# Patient Record
Sex: FEMALE | Race: WHITE | Employment: UNEMPLOYED | URBAN - METROPOLITAN AREA
[De-identification: names, ages, dates, MRNs, and addresses within clinical notes are randomized per-mention and may not be internally consistent; named-entity substitution may affect disease eponyms.]

---

## 2022-11-03 PROBLEM — I10 ESSENTIAL HYPERTENSION: Status: ACTIVE | Noted: 2022-11-03

## 2022-11-03 PROBLEM — E11.65 TYPE 2 DIABETES MELLITUS WITH HYPERGLYCEMIA, WITHOUT LONG-TERM CURRENT USE OF INSULIN (HCC): Status: ACTIVE | Noted: 2022-11-03

## 2022-11-03 PROBLEM — Z97.5 IUD (INTRAUTERINE DEVICE) IN PLACE: Status: ACTIVE | Noted: 2022-11-03

## 2022-11-03 PROBLEM — N92.6 IRREGULAR PERIODS/MENSTRUAL CYCLES: Status: ACTIVE | Noted: 2022-11-03

## 2022-12-13 NOTE — TELEPHONE ENCOUNTER
----- Message from Jareth Carmen MD sent at 12/13/2022 10:55 AM CST -----  Please inform by MyChart, mail, or call of normal laboratory tests--FSH shows that she is not yet menopausal.  Keep next appt

## 2022-12-19 PROBLEM — D21.9 FIBROID: Status: ACTIVE | Noted: 2022-12-19

## 2022-12-19 PROBLEM — T83.32XA IUD THREADS LOST: Status: ACTIVE | Noted: 2022-12-19

## 2023-01-18 NOTE — TELEPHONE ENCOUNTER
Routed to Dr Lea Limon for advise. Rx listed as historical, pls advise, thanks in advance. LOV 11-3-22. Refill passed per [facility] [facility] protocol   Requested Prescriptions   Pending Prescriptions Disp Refills    metFORMIN HCl 1000 MG Oral Tab  0     Sig: Take 1 tablet (1,000 mg total) by mouth 2 (two) times daily with meals. Diabetes Medication Protocol Passed - 1/18/2023 10:45 AM        Passed - Last A1C < 7.5 and within past 6 months     Lab Results   Component Value Date    A1C 6.9 (H) 12/12/2022             Passed - In person appointment or virtual visit in the past 6 mos or appointment in next 3 mos     Recent Outpatient Visits              1 month ago Intrauterine contraceptive device threads lost, subsequent encounter    [address] [facility], [place] [address] Cece Ohara MD    Office Visit    1 month ago Hot flashes    [address] [facility], 9509 Jocelynn Howell MD    Office Visit    2 months ago Encounter for annual health examination    [address] [facility], Leona Griggs MD    Office Visit          Future Appointments         Provider Department Appt Notes    In 1 week Cece Ohara MD [address], [address], [address] [facility]insertion    In 1 month Inis MD Elizabeth [address], [address] [facility] Lump on (R) of neck    In 1 month Bharathi Tello MD [address] [facility] np dm ee, glasses, policy informed    In 4 months Radha Zacarias MD [address], [address], [place] colon cancer screening               Passed - EGFRCR or GFRNAA > 50     GFR Evaluation  EGFRCR: 113 , resulted on 12/12/2022          Passed - GFR in the past 12 months          hydroCHLOROthiazide 12.5 MG Oral Cap  0     Sig: Take 1 capsule (12.5 mg total) by mouth daily.        Hypertensive Medications Protocol Failed - 1/18/2023 10:45 AM        Failed - Last BP reading less than 140/90     BP Readings from Last 1 Encounters:  12/19/22 : 143/84              Passed - In person appointment in the past 12 or next 3 months     Recent Outpatient Visits              1 month ago Intrauterine contraceptive device threads lost, subsequent encounter    Neil Sheriff. [address] Lynn Juarez MD    Office Visit    1 month ago Hot flashes    Khushi Sheriff, Ethel Beckman MD    Office Visit    2 months ago Encounter for annual health examination    Khushi Sheriff, Enrique Francisco MD    Office Visit          Future Appointments         Provider Department Appt Notes    In 1 week MD Kyleigh Aburto, Khushi Murphy, [address] removal/insertion    In 1 month Marybelle Najjar, MD Jeannetta Mullet, [address] [facility], [place] Lump on (R) of neck    In 1 month Luis Coronado MD [facility], [address], [place] np dm ee, glasses, policy informed    In 4 months Delilah Becerril MD [facility], [address], [place] colon cancer screening               Passed - CMP or BMP in past 6 months     Recent Results (from the past 4392 hour(s))   COMP METABOLIC PANEL (14)    Collection Time: 12/12/22 11:54 AM   Result Value Ref Range    Glucose 136 (H) 70 - 99 mg/dL    Sodium 138 136 - 145 mmol/L    Potassium 3.8 3.5 - 5.1 mmol/L    Chloride 104 98 - 112 mmol/L    CO2 28.0 21.0 - 32.0 mmol/L    Anion Gap 6 0 - 18 mmol/L    BUN 11 7 - 18 mg/dL    Creatinine 0.53 (L) 0.55 - 1.02 mg/dL    BUN/CREA Ratio 20.8 (H) 10.0 - 20.0    Calcium, Total 9.2 8.5 - 10.1 mg/dL    Calculated Osmolality 287 275 - 295 mOsm/kg    eGFR-Cr 113 >=60 mL/min/1.73m2    ALT 22 13 - 56 U/L    AST 10 (L) 15 - 37 U/L    Alkaline Phosphatase 69 39 - 100 U/L    Bilirubin, Total 0.6 0.1 - 2.0 mg/dL    Total Protein 7.1 6.4 - 8.2 g/dL    Albumin 3.8 3.4 - 5.0 g/dL    Globulin  3.3 2.8 - 4.4 g/dL    A/G Ratio 1.2 1.0 - 2.0    Patient Fasting for CMP? No      *Note: Due to a large number of results and/or encounters for the requested time period, some results have not been displayed. A complete set of results can be found in Results Review.                Passed - In person appointment or virtual visit in the past 6 months     Recent Outpatient Visits              1 month ago Intrauterine contraceptive device threads lost, subsequent encounter    JER Bryan MD    Office Visit    1 month ago Hot flashes    [address] [facility], 9509 Jocelynn Howell MD    Office Visit    2 months ago Encounter for annual health examination    [address] [facility], Tabitha Acuna MD    Office Visit          Future Appointments         Provider Department Appt Notes    In 1 week Hoang Eli MD [address], [address], [address] [facility] [address] removal/insertion    In 1 month Isidro Eddy MD [address], [address] [facility], [place] Lump on (R) of neck    In 1 month Asher Cavanaugh MD [address] [facility], [place] np dm ee, glasses, policy informed    In 4 months Annabella Loyd MD [address], [address], [place] colon cancer screening               Passed - [facility] or GFRNAA > 50     GFR Evaluation  EGFRCR: 113 , resulted on 12/12/2022

## 2023-01-30 NOTE — TELEPHONE ENCOUNTER
Please schedule the following surgery:    Procedure: Endosee removal of IUD; placement of Mirena IUD  Date: Thursday, Feb 2 morning  Dx: Lost iud strings; menorrhagia; fibroids; replacement of new IUD  Pre-op appt: (Y/N or n/a) done  Admission type: (IN/OUT/OBVS) office  Department of discharge(SDS/Floor): Office  Procedure length time (please enter amount you are requesting): 30 minutes  Recovery time (patients always ask): 24 hours  Medical Clearance: (Y/N) no      ALL Medicaid/including BCBS community: Tubal/ Hyst form MUST be signed (30 days):     COVID19 Lab 5062 needs to be placed for all C-sections, IOL & Versions     Message to nurses:

## 2023-01-30 NOTE — TELEPHONE ENCOUNTER
Called and spoke to pt. Pt agrees to appt on Thursday, 2/2 @ 10AM. Informed that she will need to leave a urine sample at time of visit.      Routing to [employer] as Mabel Menard

## 2023-02-02 PROBLEM — Z30.433 ENCOUNTER FOR IUD REMOVAL AND REINSERTION: Status: ACTIVE | Noted: 2023-02-02

## 2023-02-02 NOTE — PROCEDURES
After completion of Endosee hysteroscopic removal of old iud, uterus was sounded to 10 cm. New Mirena IUD was placed into the uterus through the cervix. The arms deployed and proper fundal positioning assured. Sheath withdrawn and string trimmed to 4 cm. Speculum removed. Patient tolerated procedure very well. Counseled per protocol  FU in 5 weeks.

## 2023-02-02 NOTE — PROCEDURES
Endosee Procedure     LMP: 12/6/23  Pregnancy Results: negative  Birth control method(s) used: Mirena IUD    Consent signed. Procedure discussed with the patient in detail including indication, risks, benefits, alternatives and complications. Indication:  Lost IUD strings; outdated IUD 14 yrs in place. Procedure:  Endosee office hysteroscopy with operative retrieval of IUD    Speculum placed in the vagina. Betadine wash of vagina and cervix performed. Single tooth tenaculum was placed at the 12 o'clock position. Endocervical dilator placed within cervical canal until slight resistance bypassed and left in place. Dilator exchanged for Endosee catheter. Camera attached. Assistant injected 1 cc / sec slowly allowing visualization of endometrial cavity. Fallopian tubes os Yes bilaterally. Findings: IUD strings within upper cervical canal.  Endometrial cavity within normal limits. No submucous fibroids or polyps. .  The fluid was withdrawn from cavity while camera was removed. IUD string was grasped with grasper and IUD removed intact. Single tooth tenaculum removed. Silver nitrate used to achieve hemostasis. Good hemostasis noted. Patient tolerated procedure well.       Visit Plan:  New Mirena IUD placed immediately

## 2023-02-27 NOTE — PROGRESS NOTES
Operative Report                                                                      Punch excision      Clinical diagnosis:  Size of lesion:   Location: pt with growing tender lesion  Spec 1 Description >>>>>: left lateral neck  Spec 1 Comment: epidermal cyst inflamed r/o other 1.5cm    Punch size: 4mm   Suture: 4-0 Vicryl Rapide  Suture removal planned in 7-14 days      Procedure: With patient in appropriate position the skin of the above was scrubbed with alcohol. Anesthesia was obtained with 1% Xylocaine with epinephrine. A circular punch was used to incise the lesion. The biopsy specimen was elevated from its base and transected. The specimen was sent for histopathologic exam.  Hemostasis was obtained with suture noted above. Estimated blood loss less than 2 cc. Biopsy dressed with Steri-Strips, bandage/other    Pressure dressing: Applied as appropriate    Complications: None    Written instructions given and reviewed with patient    Await pathology    Contact information reviewed.     Procedural physician:  Vane Ferrell MD

## 2023-03-13 PROBLEM — Z30.431 IUD CHECK UP: Status: ACTIVE | Noted: 2022-11-03

## 2023-03-13 PROBLEM — T83.32XA IUD THREADS LOST: Status: RESOLVED | Noted: 2022-12-19 | Resolved: 2023-03-13

## 2023-03-13 PROBLEM — Z30.433 ENCOUNTER FOR IUD REMOVAL AND REINSERTION: Status: RESOLVED | Noted: 2023-02-02 | Resolved: 2023-03-13

## 2023-03-15 PROBLEM — E11.9 DIABETES MELLITUS TYPE 2 WITHOUT RETINOPATHY (HCC): Status: ACTIVE | Noted: 2023-03-15

## 2023-03-15 PROBLEM — H43.393 FLOATERS IN VISUAL FIELD, BILATERAL: Status: ACTIVE | Noted: 2023-03-15

## 2023-03-15 PROBLEM — H25.13 AGE-RELATED NUCLEAR CATARACT OF BOTH EYES: Status: ACTIVE | Noted: 2023-03-15

## 2023-03-15 NOTE — PATIENT INSTRUCTIONS
Age-related nuclear cataract of both eyes  Discussed early cataracts with patient. Told patient that cataracts are age appropriate and they are not surgical at this time. No treatment recommended at this time. Diabetes mellitus type 2 without retinopathy ([facility] [facility]  Diabetes type II: no background of retinopathy, no signs of neovascularization noted. Discussed ocular and systemic benefits of blood sugar control. Diagnosis and treatment discussed in detail with patient. Floaters in visual field, bilateral   There is no evidence of retinal pathology. All signs and symptoms of retinal detachment/tears explained in detail. Patient instructed to call the office if they experience increase in floaters, increase in flashes of light, loss of vision or curtain or veil effect.

## 2023-05-02 NOTE — TELEPHONE ENCOUNTER
Refill passed per [facility] protocol   Requested Prescriptions   Pending Prescriptions Disp Refills    METFORMIN HCL 1000 MG Oral Tab [Pharmacy Med Name: METFORMIN 1000MG TABLETS] 90 tablet 1     Sig: TAKE 1 TABLET(1000 MG) BY MOUTH TWICE DAILY WITH MEALS       Diabetes Medication Protocol Passed - 5/1/2023 11:53 AM        Passed - Last A1C < 7.5 and within past 6 months     Lab Results   Component Value Date    A1C 6.9 (H) 12/12/2022               Passed - In person appointment or virtual visit in the past 6 mos or appointment in next 3 mos     Recent Outpatient Visits              1 month ago [facility] mellitus type 2 [facility] retinopathy [facility]    Sudheer Hensley, [address], Madeline Ellis MD    Office Visit    1 month ago IUD (intrauterine device) in place    Winston López MD    Office Visit    1 month ago     Sudheer Hensley, [facility]    Nurse Only    2 months ago Epidermal cyst    [address] [facility], MD Fred    Office Visit    2 months ago Intrauterine contraceptive device threads lost, subsequent encounter    Pema Sotelo MD    Office Visit          Future Appointments         Provider Department Appt Notes    In 2 weeks MD Sudheer Long, [address], [place] colon cancer screening    In 8 months Orly Marti MD Dorise Milliner, [address], [place] [address] annual    In 10 months MD Sudheer Lorenzana, [address] [facility],[address], [place] EP/ DM EE               Passed - EGFRCR or GFRNAA > 50     GFR Evaluation  EGFRCR: 113 , resulted on 12/12/2022            Passed - GFR in the past 12 months

## 2023-05-22 NOTE — TELEPHONE ENCOUNTER
Scheduled for:  Colonoscopy [address] [address]  Provider Name:  Dr. Anselmo Verma  Date:  7/5/2023  Location:  [address] [place] [address] 1  Sedation:  MAC  Time:  10:15 am, arrival 9:15 am  Prep:  Suprep  Meds/Allergies Reconciled?:  Physician reviewed  Diagnosis with codes:  Screening for colon cancer Z12.11  Was patient informed to call insurance with codes (Y/N):  Yes  Referral sent?:  Referral was sent at the time of electronic surgical scheduling. [facility] or [address] [place] [address] notified?:  I sent an electronic request to Endo Scheduling and received a confirmation today. Medication Orders:  Diabetes meds: Hold metformin or other oral hypoglycemic medication day of procedure and day before procedure  Misc Orders:  N/A     Further instructions given by staff:  I provided patient with Amharic prep instruction sheet.

## 2023-05-22 NOTE — PATIENT INSTRUCTIONS
1. Schedule colonoscopy with MAC or IV [Diagnosis: screening]    2.  bowel prep from pharmacy (split suprep or trilyte)    3. Continue all medications for procedure except  -Diabetes meds: Hold metformin or other oral hypoglycemic medication day of procedure and day before procedure. If patient is on other diabetic medications/insulin please ask primary or endocrine to manage pre-procedure and day of procedure    ** If MAC @ [facility]/NE:    - NO alcohol, recreational drugs nor erectile dysfunction mediations 24 hours before procedure(s)   - NO herbal supplements or weight loss medications x 7 days prior to the procedure(s)    ** If MAC @ [facility] or IV twilight - continue all medications as prescribed      4. Read all bowel prep instructions carefully    5. AVOID seeds, nuts, popcorn, raw fruits and vegetables (cooked is okay) for 2-3 days before procedure      >>>Please note: if you were prescribed Suprep for the bowel prep and it is too expensive or not covered by insurance, it is okay to substitute Trilyte (or any similar generic prep). This can be done by notifying the pharmacy or calling our office.

## 2023-07-05 NOTE — TELEPHONE ENCOUNTER
Scheduled for:  Colonoscopy [address] [address]  Provider Name:  Dr. Amado Summers  Date:  10/5/23  Location:  [address] [place] [address] 1  Sedation:  MAC  Time:  0930 am, arrival 0830 am  Prep:  Suprep  Meds/Allergies Reconciled?:  Physician reviewed  Diagnosis with codes:  Screening for colon cancer Z12.11  Was patient informed to call insurance with codes (Y/N):  Yes  Referral sent?:  Referral was sent at the time of electronic surgical scheduling. [address] [place] [address] or [facility] notified?:  I sent an electronic request to Endo Scheduling and received a confirmation today.      Medication Orders:  Diabetes meds: Hold metformin or other oral hypoglycemic medication day of procedure and day before procedure  Misc Orders:  N/A     Further instructions given by staff Cardiomyopathy, unspecified type

## 2023-07-05 NOTE — TELEPHONE ENCOUNTER
No one followed up on this patient. She canceled and did not show and gap not filled  Please reschedule and in the future remove from schedule     Thank you.

## 2023-08-02 NOTE — TELEPHONE ENCOUNTER
Please review refill protocol failed/ no protocol  Requested Prescriptions   Pending Prescriptions Disp Refills    HYDROCHLOROTHIAZIDE 12.5 MG Oral Cap [Pharmacy Med Name: HYDROCHLOROTHIAZIDE 12.5MG CAPSULES] 90 capsule 1     Sig: TAKE 1 CAPSULE(12.5 MG) BY MOUTH DAILY       Hypertensive Medications Protocol Failed - 8/1/2023 11:08 AM        Failed - Last BP reading less than 140/90     BP Readings from Last 1 Encounters:  05/22/23 : 140/78              Failed - CMP or BMP in past 6 months     No results found for this or any previous visit (from the past 4392 hour(s)).             Failed - In person appointment or virtual visit in the past 6 months     Recent Outpatient Visits              2 months ago Colon cancer screening    [facility], ISABELA Roberts MD    Office Visit    4 months ago Diabetes mellitus type 2 without retinopathy ([facility]    [address], [address] Sidney Chilel [facility]    Office Visit    4 months ago IUD (intrauterine device) in place    [address] [facility], [place] Meli Hammond MD    Office Visit    4 months ago     [address], [address] [facility]    Nurse Only    5 months ago Epidermal cyst    [address] [facility], Thony Leavitt MD    Office Visit          Future Appointments         Provider Department Appt Notes    In 2 months [address] [facility] [address], [address], [place] Colon w MAC @ [facility]    In 7 months Meli Hammond MD [facility], [place] annual    In 7 months Christal Painting MD [address], [address], [facility] EP/ DM EE               Passed - In person appointment in the past 12 or next 3 months     Recent Outpatient Visits              2 months ago Colon cancer screening    [address], [address], Armando Bishop Nichole MD    Office Visit    4 months ago Diabetes mellitus type 2 without retinopathy ([facility]    [address], [address], [place], Maura Worthington MD    Office Visit    4 months ago IUD (intrauterine device) in place    [address] MD Lynda    Office Visit    4 months ago     [address], [address] [facility]    Nurse Only    5 months ago Epidermal cyst    Gla Dean MD    Office Visit          Future Appointments         Provider Department Appt Notes    In 2 months [address], [address], [place] Colon w MAC @ [facility]    In 7 months [place], Zion Davalos MD [address] [place] [address], [facility] 21 annual    In 7 months Hazel Feliciano MD [address], [address], [place] EP/ DM [facility] or GFRNAA > 50     GFR Evaluation  EGFRCR: 113 , resulted on 12/12/2022

## 2023-08-02 NOTE — TELEPHONE ENCOUNTER
Last visit 11/3/22. Future Appointments   Date Time Provider Sujey Quintanilla   10/5/2023  9:30 AM SHUKRI, PROCEDURE [address] None   3/18/2024 11:00 AM MD ALEA Aburto   3/19/2024 10:00 AM Luis Coronado [facility]       MyCSan Francisco sent=== CSS=please assists. Please review; protocol failed/no protocol. Requested Prescriptions   Pending Prescriptions Disp Refills    hydroCHLOROthiazide 12.5 MG Oral Cap 90 capsule 1     Sig: Take 1 capsule (12.5 mg total) by mouth daily. Hypertensive Medications Protocol Failed - 8/1/2023  4:31 PM        Failed - Last BP reading less than 140/90     BP Readings from Last 1 Encounters:  05/22/23 : 140/78              Failed - CMP or BMP in past 6 months     No results found for this or any previous visit (from the past 4392 hour(s)).             Failed - In person appointment or virtual visit in the past 6 months     Recent Outpatient Visits              2 months ago Colon cancer screening    Armando Soria ANKARSRUM, MD    Office Visit    4 months ago Diabetes mellitus type 2 without retinopathy ([facility]    [address], [address], [place], Minh Puente MD    Office Visit    4 months ago IUD (intrauterine device) in place    Geetha Soria 21 Lynn Juarez MD    Office Visit    4 months ago     [address], [address] [place]    Nurse Only    5 months ago Epidermal cyst    [address], 148 Misti Hall MD    Office Visit          Future Appointments         Provider Department Appt Notes    In 2 months [address], [address], [place] Colon w MAC @ [facility]    In 7 months Aldo mercer, MD Walter Cheng [place] annual    In 7 months Luis Coronado MD [address], [facility] [place] EP/ DM EE               Passed - In person appointment in the past 12 or next 3 months     Recent Outpatient Visits              2 months ago Colon cancer screening    [address] [place] Armando Glover ANKARSRUM, MD    Office Visit    4 months ago Diabetes mellitus type 2 without retinopathy ([facility] [facility]    [address], [address], [place], Manuel Griffin MD    Office Visit    4 months ago IUD (intrauterine device) in place    [address] [facility], [place] Radha Worley MD    Office Visit    4 months ago     [address], [address] [facility]    Nurse Only    5 months ago Epidermal cyst    Srinivas Wan, Naida Gongora MD    Office Visit          Future Appointments         Provider Department Appt Notes    In 2 months [address] [address] [address], [address], [place] Colon w MAC @ [facility]    In 7 months Radha Worley MD [address] [facility], [place] annual    In 7 months Rosanne Hooker MD [address], [address], [place] EP/ DM EE               Passed - EGFRCR or GFRNAA > 50     GFR Evaluation  EGFRCR: 113 , resulted on 12/12/2022                Recent Outpatient Visits              2 months ago Colon cancer screening    [facility] ISABELA Roberts MD    Office Visit    4 months ago Diabetes mellitus type 2 without retinopathy ([facility]    [address] [facility], Christa Wright MD    Office Visit    4 months ago IUD (intrauterine device) in place    [address], [address], Larry Harmon MD    Office Visit    4 months ago     [address], [address] [facility]    Nurse Only    5 months ago Epidermal cyst    [address], [address] [facility] Jerome Manjarrez MD    Office Visit             Future Appointments         Provider Department Appt Notes    In 2 months Chance, [address], [address], [place] Colon w [address] @ [facility]    In 7 months Orly Marti MD Sonjia Born, [address], [place] annual    In 7 months MD Kylah Wetzel, [address], [place] EP/ DM EE

## 2023-08-03 NOTE — TELEPHONE ENCOUNTER
Appointments scheduled.      Medication pended for your review and approval.     Future Appointments   Date Time Provider Sujey Quintanilla   9/25/2023  9:45 AM MD SAI Marin EC ADO   10/5/2023  9:30 AM SHUKRI, PROCEDURE [address] None   11/6/2023  9:30 AM MD SAI Marin EC ADO   3/18/2024 11:00 AM MD ALEA Gibson  ADO   3/19/2024 10:00 AM Hazel Feliciano MD [address] [facility]

## 2023-08-03 NOTE — TELEPHONE ENCOUNTER
Please have her schedule a physical exam appointment in the next couple months she has many care gaps to close.

## 2023-08-03 NOTE — TELEPHONE ENCOUNTER
Pt has an appointment scheduled with Dr. Carol Norton on 9-23 for follow up on meds and also on 11-6-23 for a physical.

## 2023-09-25 ENCOUNTER — LAB ENCOUNTER (OUTPATIENT)
Dept: LAB | Age: 53
End: 2023-09-25
Attending: FAMILY MEDICINE
Payer: MEDICAID

## 2023-09-25 ENCOUNTER — OFFICE VISIT (OUTPATIENT)
Dept: FAMILY MEDICINE CLINIC | Facility: CLINIC | Age: 53
End: 2023-09-25

## 2023-09-25 VITALS
BODY MASS INDEX: 31 KG/M2 | HEART RATE: 65 BPM | TEMPERATURE: 98 F | SYSTOLIC BLOOD PRESSURE: 126 MMHG | WEIGHT: 169 LBS | DIASTOLIC BLOOD PRESSURE: 80 MMHG

## 2023-09-25 DIAGNOSIS — E11.65 TYPE 2 DIABETES MELLITUS WITH HYPERGLYCEMIA, WITHOUT LONG-TERM CURRENT USE OF INSULIN (HCC): Primary | ICD-10-CM

## 2023-09-25 DIAGNOSIS — Z12.31 ENCOUNTER FOR SCREENING MAMMOGRAM FOR MALIGNANT NEOPLASM OF BREAST: ICD-10-CM

## 2023-09-25 DIAGNOSIS — E11.65 TYPE 2 DIABETES MELLITUS WITH HYPERGLYCEMIA, WITHOUT LONG-TERM CURRENT USE OF INSULIN (HCC): ICD-10-CM

## 2023-09-25 DIAGNOSIS — E78.00 HYPERCHOLESTEREMIA: ICD-10-CM

## 2023-09-25 PROBLEM — K02.9 CARIES: Status: ACTIVE | Noted: 2023-09-25

## 2023-09-25 PROBLEM — K21.9 GERD (GASTROESOPHAGEAL REFLUX DISEASE): Status: ACTIVE | Noted: 2023-09-25

## 2023-09-25 LAB
ALBUMIN SERPL-MCNC: 3.9 G/DL (ref 3.4–5)
ALBUMIN/GLOB SERPL: 1 {RATIO} (ref 1–2)
ALP LIVER SERPL-CCNC: 73 U/L
ALT SERPL-CCNC: 19 U/L
ANION GAP SERPL CALC-SCNC: 5 MMOL/L (ref 0–18)
AST SERPL-CCNC: 8 U/L (ref 15–37)
BILIRUB SERPL-MCNC: 0.6 MG/DL (ref 0.1–2)
BUN BLD-MCNC: 10 MG/DL (ref 7–18)
CALCIUM BLD-MCNC: 9.7 MG/DL (ref 8.5–10.1)
CHLORIDE SERPL-SCNC: 105 MMOL/L (ref 98–112)
CHOLEST SERPL-MCNC: 167 MG/DL (ref ?–200)
CO2 SERPL-SCNC: 27 MMOL/L (ref 21–32)
CREAT BLD-MCNC: 0.69 MG/DL
CREAT UR-SCNC: 86.8 MG/DL
EGFRCR SERPLBLD CKD-EPI 2021: 105 ML/MIN/1.73M2 (ref 60–?)
EST. AVERAGE GLUCOSE BLD GHB EST-MCNC: 174 MG/DL (ref 68–126)
FASTING PATIENT LIPID ANSWER: YES
FASTING STATUS PATIENT QL REPORTED: YES
GLOBULIN PLAS-MCNC: 3.9 G/DL (ref 2.8–4.4)
GLUCOSE BLD-MCNC: 142 MG/DL (ref 70–99)
HBA1C MFR BLD: 7.7 % (ref ?–5.7)
HDLC SERPL-MCNC: 46 MG/DL (ref 40–59)
LDLC SERPL CALC-MCNC: 98 MG/DL (ref ?–100)
MICROALBUMIN UR-MCNC: 5.2 MG/DL
MICROALBUMIN/CREAT 24H UR-RTO: 59.9 UG/MG (ref ?–30)
NONHDLC SERPL-MCNC: 121 MG/DL (ref ?–130)
OSMOLALITY SERPL CALC.SUM OF ELEC: 285 MOSM/KG (ref 275–295)
POTASSIUM SERPL-SCNC: 4.2 MMOL/L (ref 3.5–5.1)
PROT SERPL-MCNC: 7.8 G/DL (ref 6.4–8.2)
SODIUM SERPL-SCNC: 137 MMOL/L (ref 136–145)
TRIGL SERPL-MCNC: 126 MG/DL (ref 30–149)
VLDLC SERPL CALC-MCNC: 21 MG/DL (ref 0–30)

## 2023-09-25 PROCEDURE — 83036 HEMOGLOBIN GLYCOSYLATED A1C: CPT

## 2023-09-25 PROCEDURE — 80061 LIPID PANEL: CPT

## 2023-09-25 PROCEDURE — 82043 UR ALBUMIN QUANTITATIVE: CPT

## 2023-09-25 PROCEDURE — 3074F SYST BP LT 130 MM HG: CPT | Performed by: FAMILY MEDICINE

## 2023-09-25 PROCEDURE — 80053 COMPREHEN METABOLIC PANEL: CPT

## 2023-09-25 PROCEDURE — 99214 OFFICE O/P EST MOD 30 MIN: CPT | Performed by: FAMILY MEDICINE

## 2023-09-25 PROCEDURE — 36415 COLL VENOUS BLD VENIPUNCTURE: CPT

## 2023-09-25 PROCEDURE — 3079F DIAST BP 80-89 MM HG: CPT | Performed by: FAMILY MEDICINE

## 2023-09-25 PROCEDURE — 82570 ASSAY OF URINE CREATININE: CPT

## 2023-09-25 PROCEDURE — 3051F HG A1C>EQUAL 7.0%<8.0%: CPT | Performed by: FAMILY MEDICINE

## 2023-09-25 RX ORDER — ATORVASTATIN CALCIUM 20 MG/1
20 TABLET, FILM COATED ORAL NIGHTLY
Qty: 90 TABLET | Refills: 3 | Status: SHIPPED | OUTPATIENT
Start: 2023-09-25

## 2023-09-25 RX ORDER — SEMAGLUTIDE 0.68 MG/ML
0.25 INJECTION, SOLUTION SUBCUTANEOUS WEEKLY
Qty: 1 EACH | Refills: 12 | Status: SHIPPED | OUTPATIENT
Start: 2023-09-25

## 2023-09-27 ENCOUNTER — TELEPHONE (OUTPATIENT)
Dept: FAMILY MEDICINE CLINIC | Facility: CLINIC | Age: 53
End: 2023-09-27

## 2023-09-27 ENCOUNTER — HOSPITAL ENCOUNTER (OUTPATIENT)
Dept: MAMMOGRAPHY | Age: 53
Discharge: HOME | End: 2023-09-27
Attending: FAMILY MEDICINE
Payer: MEDICAID

## 2023-09-27 DIAGNOSIS — Z12.31 ENCOUNTER FOR SCREENING MAMMOGRAM FOR MALIGNANT NEOPLASM OF BREAST: ICD-10-CM

## 2023-09-27 DIAGNOSIS — E11.65 TYPE 2 DIABETES MELLITUS WITH HYPERGLYCEMIA, WITHOUT LONG-TERM CURRENT USE OF INSULIN (HCC): Primary | ICD-10-CM

## 2023-09-27 PROCEDURE — 77067 SCR MAMMO BI INCL CAD: CPT | Performed by: FAMILY MEDICINE

## 2023-09-27 PROCEDURE — 77063 BREAST TOMOSYNTHESIS BI: CPT | Performed by: FAMILY MEDICINE

## 2023-09-27 RX ORDER — DULAGLUTIDE 0.75 MG/.5ML
0.75 INJECTION, SOLUTION SUBCUTANEOUS WEEKLY
Qty: 2 ML | Refills: 3 | Status: SHIPPED | OUTPATIENT
Start: 2023-09-27

## 2023-09-27 NOTE — TELEPHONE ENCOUNTER
Current Outpatient Medications:     semaglutide (OZEMPIC, 0.25 OR 0.5 MG/DOSE,) 2 MG/3ML Subcutaneous Solution Pen-injector, Inject 0.25 mg into the skin once a week., Disp: 1 each, Rfl: 12    KEY: [id number]  PATIENT LAST NAME: Henry Esquivel  : 1970

## 2023-09-28 ENCOUNTER — TELEPHONE (OUTPATIENT)
Dept: FAMILY MEDICINE CLINIC | Facility: CLINIC | Age: 53
End: 2023-09-28

## 2023-09-28 NOTE — TELEPHONE ENCOUNTER
Language Line utilized.  ID # [id number], Hilda Yun. RN called patient. Patient's date of birth and full name both confirmed. Provided education  on dosing for dulaglutide. Also advised that Metformin is still on her medication list, continue to take this as prescribed. All questions/concerns addressed. She verbalizes understanding of all information, and agreeable to plan. Denies additional questions.

## 2023-09-28 NOTE — TELEPHONE ENCOUNTER
Patient requesting to know if the dosage for Trulicity is correct, states when she was prescribed the [facility] the dosage was . 25 and with TRULICITY she was given . 75. Patient would like to also know if she needs to stop taking her metformin. Please advise.

## 2023-09-28 NOTE — TELEPHONE ENCOUNTER
Patient requesting to know if the dosage for Trulicity is correct, states when she was prescribed the [facility] the dosage was . 25 and with TRULICITY she was given . 75. Patient would like to also know if she needs to stop taking her metformin. Please advise.

## 2023-09-28 NOTE — TELEPHONE ENCOUNTER
Calling patient with  CU#[id number] Belinda Race  Message was left  Ozempic denied doctor sent alternative medication Trulicity

## 2023-10-04 ENCOUNTER — TELEPHONE (OUTPATIENT)
Dept: FAMILY MEDICINE CLINIC | Facility: CLINIC | Age: 53
End: 2023-10-04

## 2023-10-04 DIAGNOSIS — E11.65 TYPE 2 DIABETES MELLITUS WITH HYPERGLYCEMIA, WITHOUT LONG-TERM CURRENT USE OF INSULIN (HCC): ICD-10-CM

## 2023-10-04 RX ORDER — SEMAGLUTIDE 0.68 MG/ML
0.25 INJECTION, SOLUTION SUBCUTANEOUS WEEKLY
Qty: 1 EACH | Refills: 11 | OUTPATIENT
Start: 2023-10-04

## 2023-10-04 NOTE — TELEPHONE ENCOUNTER
Pt is only on Trulicity - [facility] [place] [facility] by [facility] , note sent to Pharmacy

## 2023-10-05 ENCOUNTER — HOSPITAL ENCOUNTER (OUTPATIENT)
Age: 53
Setting detail: HOSPITAL OUTPATIENT SURGERY
Discharge: HOME | End: 2023-10-05
Attending: INTERNAL MEDICINE | Admitting: INTERNAL MEDICINE
Payer: MEDICAID

## 2023-10-05 ENCOUNTER — ANESTHESIA EVENT (OUTPATIENT)
Dept: ENDOSCOPY | Age: 53
End: 2023-10-05
Payer: MEDICAID

## 2023-10-05 ENCOUNTER — ANESTHESIA (OUTPATIENT)
Dept: ENDOSCOPY | Age: 53
End: 2023-10-05
Payer: MEDICAID

## 2023-10-05 VITALS
WEIGHT: 169 LBS | RESPIRATION RATE: 12 BRPM | DIASTOLIC BLOOD PRESSURE: 69 MMHG | HEART RATE: 62 BPM | HEIGHT: 62 IN | SYSTOLIC BLOOD PRESSURE: 124 MMHG | OXYGEN SATURATION: 100 % | BODY MASS INDEX: 31.1 KG/M2

## 2023-10-05 DIAGNOSIS — Z12.11 COLON CANCER SCREENING: ICD-10-CM

## 2023-10-05 LAB
B-HCG UR QL: NEGATIVE
GLUCOSE BLDC GLUCOMTR-MCNC: 122 MG/DL (ref 70–99)

## 2023-10-05 PROCEDURE — 45385 COLONOSCOPY W/LESION REMOVAL: CPT | Performed by: INTERNAL MEDICINE

## 2023-10-05 RX ORDER — DEXTROSE MONOHYDRATE 25 G/50ML
50 INJECTION, SOLUTION INTRAVENOUS
OUTPATIENT
Start: 2023-10-05

## 2023-10-05 RX ORDER — SODIUM CHLORIDE, SODIUM LACTATE, POTASSIUM CHLORIDE, CALCIUM CHLORIDE 600; 310; 30; 20 MG/100ML; MG/100ML; MG/100ML; MG/100ML
INJECTION, SOLUTION INTRAVENOUS CONTINUOUS
Status: DISCONTINUED | OUTPATIENT
Start: 2023-10-05 | End: 2023-10-05

## 2023-10-05 RX ORDER — NICOTINE POLACRILEX 4 MG
15 LOZENGE BUCCAL
OUTPATIENT
Start: 2023-10-05

## 2023-10-05 RX ORDER — NALOXONE HYDROCHLORIDE 0.4 MG/ML
80 INJECTION, SOLUTION INTRAMUSCULAR; INTRAVENOUS; SUBCUTANEOUS AS NEEDED
OUTPATIENT
Start: 2023-10-05 | End: 2023-10-05

## 2023-10-05 RX ORDER — LIDOCAINE HYDROCHLORIDE 10 MG/ML
INJECTION, SOLUTION EPIDURAL; INFILTRATION; INTRACAUDAL; PERINEURAL AS NEEDED
Status: DISCONTINUED | OUTPATIENT
Start: 2023-10-05 | End: 2023-10-05 | Stop reason: SURG

## 2023-10-05 RX ORDER — NICOTINE POLACRILEX 4 MG
30 LOZENGE BUCCAL
OUTPATIENT
Start: 2023-10-05

## 2023-10-05 RX ORDER — SODIUM CHLORIDE, SODIUM LACTATE, POTASSIUM CHLORIDE, CALCIUM CHLORIDE 600; 310; 30; 20 MG/100ML; MG/100ML; MG/100ML; MG/100ML
INJECTION, SOLUTION INTRAVENOUS CONTINUOUS
OUTPATIENT
Start: 2023-10-05

## 2023-10-05 RX ADMIN — LIDOCAINE HYDROCHLORIDE 30 MG: 10 INJECTION, SOLUTION EPIDURAL; INFILTRATION; INTRACAUDAL; PERINEURAL at 08:59:00

## 2023-10-05 RX ADMIN — SODIUM CHLORIDE, SODIUM LACTATE, POTASSIUM CHLORIDE, CALCIUM CHLORIDE: 600; 310; 30; 20 INJECTION, SOLUTION INTRAVENOUS at 08:56:00

## 2023-10-05 RX ADMIN — SODIUM CHLORIDE, SODIUM LACTATE, POTASSIUM CHLORIDE, CALCIUM CHLORIDE: 600; 310; 30; 20 INJECTION, SOLUTION INTRAVENOUS at 09:27:00

## 2023-10-05 NOTE — OPERATIVE REPORT
COLONOSCOPY REPORT    Toña Flores     3/5/1970 Age 48year old   PCP Keren Meyers MD Endoscopist Vinh Ly MD     Date of procedure: 10/05/23    Procedure: Colonoscopy w/cold snare polypectomy    Pre-operative diagnosis: screening    Post-operative diagnosis: polyp and hemorrhoids    Medications: MAC sedation    Withdrawal time: 15 minutes    Procedure:  Informed consent was obtained from the patient after the risks of the procedure were discussed, including but not limited to bleeding, perforation, aspiration, infection, or possibility of a missed lesion. After discussions of the risks/benefits and alternatives to this procedure, as well as the planned sedation, the patient was placed in the left lateral decubitus position and begun on continuous blood pressure pulse oximetry and EKG monitoring and this was maintained throughout the procedure. Once an adequate level of sedation was obtained a digital rectal exam was completed. Then the lubricated tip of the [id number] diagnostic video colonoscope was inserted and advanced without difficulty to the cecum using the CO2 insufflation technique. The cecum was identified by localizing the trifold, the appendix and the ileocecal valve. Withdrawal was begun with thorough washing and careful examination of the colonic walls and folds. A routine second examination of the cecum/ascending colon was performed. Photodocumentation was obtained. The bowel prep was good. Views of the colon were excellent with washing. I then carefully withdrew the instrument from the patient who tolerated the procedure well. Complications: none. Findings:   1. 1 polyp(s) noted as follows:      A. 6 mm polyp in the cecum; flat morphology; cold snare polypectomy and retrieved. 2. Diverticulosis: none appreciated. 3. Terminal ileum: the visualized mucosa appeared normal.    4. A retroflexed view of the rectum revealed hemorrhoids.     5. The colonic mucosa throughout the colon showed normal vascular pattern, without evidence of angioectasias or inflammation. 6. AAMIR: normal rectal tone, no masses palpated. Recommend:  Await pathology, likely repeat colonoscopy in 5-10 years. The interval for the next colonoscopy will be determined after reviewing pathology. If new signs or symptoms develop, colonoscopy may need to be repeated sooner. High fiber diet. Monitor for blood in the stool. If having more than just tinge of blood, call office or go to the ER.    >>>If tissue was obtained and you have not received your pathology results either by phone or letter within 2 weeks, please call our office at [phone].     Specimens: cecal polyp

## 2023-10-05 NOTE — ANESTHESIA POSTPROCEDURE EVALUATION
Patient: Lacey Cates    Procedure Summary       Date: 10/05/23 Room / Location: [facility] / [facility] [facility] ENDO    Anesthesia Start: 4561 Anesthesia Stop:     Procedure: COLONOSCOPY with polypectomy Diagnosis:       Colon cancer screening      (Polyp, hemorrhoids)    Surgeons: Larissa Gibson MD Anesthesiologist:     Anesthesia Type: general ASA Status: 2            Anesthesia Type: general    Vitals Value Taken Time   /65 10/05/23 0927   Temp N/a 10/05/23 0927   Pulse 79 10/05/23 0927   Resp 18 10/05/23 0927   SpO2 100 % 10/05/23 0927       [facility] AN Post Evaluation:   Patient Evaluated in PACU  Patient Participation: complete - patient participated  Level of Consciousness: sleepy but conscious  Pain Management: adequate  Airway Patency:patent  Yes    Cardiovascular Status: acceptable  Respiratory Status: acceptable and room air  Postoperative Hydration acceptable      Winifred Kumar CRNA  10/5/2023 9:27 AM

## 2023-10-05 NOTE — DISCHARGE INSTRUCTIONS
Home Care Instructions for Colonoscopy with Sedation    Diet:  - Resume your regular diet as tolerated unless otherwise instructed. - Start with light meals to minimize bloating.  - Do not drink alcohol today. Medication:  - If you have questions about resuming your normal medications, please contact your Primary Care Physician. Activities:  - Take it easy today. Do not return to work today. - Do not drive today. - Do not operate any machinery today (including kitchen equipment). Colonoscopy:  - You may notice some rectal \"spotting\" (a little blood on the toilet tissue) for a day or two after the exam. This is normal.  - If you experience any rectal bleeding (not spotting), persistent tenderness or sharp severe abdominal pains, oral temperature over 100 degrees Fahrenheit, light-headedness or dizziness, or any other problems, contact your doctor. Gastroscopy:  - You may have a sore throat for 2-3 days following the exam. This is normal. Gargling with warm salt water (1/2 tsp salt to 1 glass warm water) or using throat lozenges will help. - If you experience any sharp pain in your neck, abdomen or chest, vomiting of blood, oral temperature over 100 degrees Fahrenheit, light-headedness or dizziness, or any other problems, contact your doctor. **If unable to reach your doctor, please go to the [facility] Emergency Room**    - Your referring physician will receive a full report of your examination.  - If you do not hear from your doctor's office within two weeks of your biopsy, please call them for your results. You may be able to see your laboratory results in [email or web address] between 4 and 7 business days. In some cases, your physician may not have viewed the results before they are released to [address] If you have questions regarding your results contact the physician who ordered the test/exam by phone or via [address] by choosing \"Ask a Medical Question. \"

## 2023-11-06 ENCOUNTER — OFFICE VISIT (OUTPATIENT)
Dept: FAMILY MEDICINE CLINIC | Facility: CLINIC | Age: 53
End: 2023-11-06

## 2023-11-06 VITALS
WEIGHT: 166 LBS | DIASTOLIC BLOOD PRESSURE: 80 MMHG | BODY MASS INDEX: 30 KG/M2 | SYSTOLIC BLOOD PRESSURE: 121 MMHG | HEART RATE: 76 BPM

## 2023-11-06 DIAGNOSIS — Z23 NEED FOR PNEUMOCOCCAL VACCINE: ICD-10-CM

## 2023-11-06 DIAGNOSIS — Z00.00 ENCOUNTER FOR ANNUAL HEALTH EXAMINATION: Primary | ICD-10-CM

## 2023-11-06 DIAGNOSIS — Z23 NEED FOR SHINGLES VACCINE: ICD-10-CM

## 2023-11-06 PROCEDURE — 3079F DIAST BP 80-89 MM HG: CPT | Performed by: FAMILY MEDICINE

## 2023-11-06 PROCEDURE — 99396 PREV VISIT EST AGE 40-64: CPT | Performed by: FAMILY MEDICINE

## 2023-11-06 PROCEDURE — 3074F SYST BP LT 130 MM HG: CPT | Performed by: FAMILY MEDICINE

## 2023-11-06 RX ORDER — ZOSTER VACCINE RECOMBINANT, ADJUVANTED 50 MCG/0.5
0.5 KIT INTRAMUSCULAR ONCE
Qty: 1 EACH | Refills: 1 | Status: SHIPPED | OUTPATIENT
Start: 2023-11-06 | End: 2023-11-06

## 2023-11-17 DIAGNOSIS — I10 ESSENTIAL HYPERTENSION: ICD-10-CM

## 2023-11-18 RX ORDER — LOSARTAN POTASSIUM 100 MG/1
100 TABLET ORAL EVERY MORNING
Qty: 90 TABLET | Refills: 3 | Status: SHIPPED | OUTPATIENT
Start: 2023-11-18

## 2023-11-18 NOTE — TELEPHONE ENCOUNTER
Refill passed per [employer] protocol.     Requested Prescriptions   Pending Prescriptions Disp Refills    LOSARTAN 100 MG Oral Tab [Pharmacy Med Name: LOSARTAN 100MG TABLETS] 90 tablet 3     Sig: TAKE 1 TABLET(100 MG) BY MOUTH IN THE MORNING       Hypertensive Medications Protocol Passed - 11/17/2023  9:24 AM        Passed - In person appointment in the past 12 or next 3 months     Recent Outpatient Visits              1 week ago Encounter for annual health examination    Africa Gleason MD    Office Visit    1 month ago Type 2 diabetes mellitus with hyperglycemia, without long-term current use of insulin [facility])    [address], [address], Africa Medina MD    Office Visit    6 months ago Colon cancer screening    Armando Gleason ANKARSRUM, MD    Office Visit    8 months ago Diabetes mellitus type 2 [facility] retin[facility]    [address], [address], [place], Delilah Medina MD    Office Visit    8 months ago IUD (intrauterine device) in place    Shira Aparicio, [address] Orly Culver MD    Office Visit          Future Appointments         Provider Department Appt Notes    In 3 days Hannah Small PA-C [address], [address] body check    In 4 months Orly Marti MD [address], [address], [address] [address] annual    In 4 months Bhavna Treadwell MD [address], [address], [address] EP/ DM EE               Passed - Last BP reading less than 140/90     BP Readings from Last 1 Encounters:   11/06/23 121/80               Passed - CMP or BMP in past 6 months     Recent Results (from the past 4392 hour(s))   Comp Metabolic Panel (14)    Collection Time: 09/25/23 10:49 AM   Result Value Ref Range    Glucose 142 (H) 70 - 99 mg/dL    Sodium 137 136 - 145 mmol/L Potassium 4.2 3.5 - 5.1 mmol/L    Chloride 105 98 - 112 mmol/L    CO2 27.0 21.0 - 32.0 mmol/L    Anion Gap 5 0 - 18 mmol/L    BUN 10 7 - 18 mg/dL    Creatinine 0.69 0.55 - 1.02 mg/dL    Calcium, Total 9.7 8.5 - 10.1 mg/dL    Calculated Osmolality 285 275 - 295 mOsm/kg    eGFR-Cr 105 >=60 mL/min/1.73m2    AST 8 (L) 15 - 37 U/L    ALT 19 13 - 56 U/L    Alkaline Phosphatase 73 41 - 108 U/L    Bilirubin, Total 0.6 0.1 - 2.0 mg/dL    Total Protein 7.8 6.4 - 8.2 g/dL    Albumin 3.9 3.4 - 5.0 g/dL    Globulin  3.9 2.8 - 4.4 g/dL    A/G Ratio 1.0 1.0 - 2.0    Patient Fasting for CMP? Yes      *Note: Due to a large number of results and/or encounters for the requested time period, some results have not been displayed. A complete set of results can be found in Results Review.                Passed - In person appointment or virtual visit in the past 6 months     Recent Outpatient Visits              1 week ago Encounter for annual health examination    Jorden Dove MD    Office Visit    1 month ago Type 2 diabetes mellitus with hyperglycemia, without long-term current use of insulin [facility])    Becca Dove MD    Office Visit    6 months ago Colon cancer screening    Armando Dove Wandra Ferron, MD    Office Visit    8 months ago Diabetes mellitus type 2 without retinopathy [facility])    Marsha Norris, [address], [place] Janet Salomon MD    Office Visit    8 months ago IUD (intrauterine device) in place    [address], Steven Smith MD    Office Visit          Future Appointments         Provider Department Appt Notes    In 3 days JAVIER Yang, [address] [facility] body check    In 4 months MD Kirsten Xavier Addison - OB/GYN annual    [facility] 4 [facility] [address], [place] EP/ DM EE               Passed - [facility] or GFRNAA > 50     GFR Evaluation  EGFRCR: 105 , resulted on 9/25/2023             Recent Outpatient Visits              1 week ago Encounter for annual health examination    [address] [facility], Jorden Kothari MD    Office Visit    1 month ago Type 2 diabetes mellitus with hyperglycemia, without long-term current use of insulin [facility])    [address] [facility], Irma Wade MD    Office Visit    6 months ago Colon cancer screening    [address] [facility], Jhonatan Roberts MD    Office Visit    8 months ago Diabetes mellitus type 2 without retinopathy [facility])    [address], [address], [place] Kamala Jackson MD    Office Visit    8 months ago IUD (intrauterine device) in place    [address], [address], Cindy Kenyon MD    Office Visit          Future Appointments         Provider Department Appt Notes    In 3 days Beaulah Cranker, PA-C [address], [place] body check    In 4 months Orly Marti MD [address], [address], [address] annual    In 4 months Kamala Jackson MD [address], [address], [place] EP/ DM EE

## 2023-11-21 ENCOUNTER — OFFICE VISIT (OUTPATIENT)
Dept: DERMATOLOGY CLINIC | Facility: CLINIC | Age: 53
End: 2023-11-21

## 2023-11-21 DIAGNOSIS — D22.9 MULTIPLE NEVI: ICD-10-CM

## 2023-11-21 DIAGNOSIS — Z12.83 SCREENING EXAM FOR SKIN CANCER: Primary | ICD-10-CM

## 2023-11-21 DIAGNOSIS — L82.1 SEBORRHEIC KERATOSIS: ICD-10-CM

## 2023-11-21 PROCEDURE — 99203 OFFICE O/P NEW LOW 30 MIN: CPT | Performed by: PHYSICIAN ASSISTANT

## 2023-12-18 ENCOUNTER — LAB ENCOUNTER (OUTPATIENT)
Dept: LAB | Age: 53
End: 2023-12-18
Attending: FAMILY MEDICINE
Payer: MEDICAID

## 2023-12-18 DIAGNOSIS — Z00.00 ENCOUNTER FOR ANNUAL HEALTH EXAMINATION: ICD-10-CM

## 2023-12-18 LAB
ALBUMIN SERPL-MCNC: 4.4 G/DL (ref 3.2–4.8)
ALBUMIN/GLOB SERPL: 1.6 {RATIO} (ref 1–2)
ALP LIVER SERPL-CCNC: 67 U/L
ALT SERPL-CCNC: 9 U/L
ANION GAP SERPL CALC-SCNC: 7 MMOL/L (ref 0–18)
AST SERPL-CCNC: 14 U/L (ref ?–34)
BASOPHILS # BLD AUTO: 0.08 X10(3) UL (ref 0–0.2)
BASOPHILS NFR BLD AUTO: 1.2 %
BILIRUB SERPL-MCNC: 0.6 MG/DL (ref 0.3–1.2)
BUN BLD-MCNC: 8 MG/DL (ref 9–23)
BUN/CREAT SERPL: 12.3 (ref 10–20)
CALCIUM BLD-MCNC: 9.7 MG/DL (ref 8.7–10.4)
CHLORIDE SERPL-SCNC: 104 MMOL/L (ref 98–112)
CHOLEST SERPL-MCNC: 132 MG/DL (ref ?–200)
CO2 SERPL-SCNC: 29 MMOL/L (ref 21–32)
CREAT BLD-MCNC: 0.65 MG/DL
DEPRECATED RDW RBC AUTO: 43.2 FL (ref 35.1–46.3)
EGFRCR SERPLBLD CKD-EPI 2021: 107 ML/MIN/1.73M2 (ref 60–?)
EOSINOPHIL # BLD AUTO: 0.36 X10(3) UL (ref 0–0.7)
EOSINOPHIL NFR BLD AUTO: 5.4 %
ERYTHROCYTE [DISTWIDTH] IN BLOOD BY AUTOMATED COUNT: 13 % (ref 11–15)
EST. AVERAGE GLUCOSE BLD GHB EST-MCNC: 126 MG/DL (ref 68–126)
FASTING PATIENT LIPID ANSWER: YES
FASTING STATUS PATIENT QL REPORTED: YES
GLOBULIN PLAS-MCNC: 2.8 G/DL (ref 2.8–4.4)
GLUCOSE BLD-MCNC: 102 MG/DL (ref 70–99)
HBA1C MFR BLD: 6 % (ref ?–5.7)
HCT VFR BLD AUTO: 41.4 %
HDLC SERPL-MCNC: 43 MG/DL (ref 40–59)
HGB BLD-MCNC: 13.3 G/DL
IMM GRANULOCYTES # BLD AUTO: 0.04 X10(3) UL (ref 0–1)
IMM GRANULOCYTES NFR BLD: 0.6 %
LDLC SERPL CALC-MCNC: 73 MG/DL (ref ?–100)
LYMPHOCYTES # BLD AUTO: 1.89 X10(3) UL (ref 1–4)
LYMPHOCYTES NFR BLD AUTO: 28.3 %
MCH RBC QN AUTO: 29.4 PG (ref 26–34)
MCHC RBC AUTO-ENTMCNC: 32.1 G/DL (ref 31–37)
MCV RBC AUTO: 91.4 FL
MONOCYTES # BLD AUTO: 0.55 X10(3) UL (ref 0.1–1)
MONOCYTES NFR BLD AUTO: 8.2 %
NEUTROPHILS # BLD AUTO: 3.76 X10 (3) UL (ref 1.5–7.7)
NEUTROPHILS # BLD AUTO: 3.76 X10(3) UL (ref 1.5–7.7)
NEUTROPHILS NFR BLD AUTO: 56.3 %
NONHDLC SERPL-MCNC: 89 MG/DL (ref ?–130)
OSMOLALITY SERPL CALC.SUM OF ELEC: 289 MOSM/KG (ref 275–295)
PLATELET # BLD AUTO: 223 10(3)UL (ref 150–450)
POTASSIUM SERPL-SCNC: 3.8 MMOL/L (ref 3.5–5.1)
PROT SERPL-MCNC: 7.2 G/DL (ref 5.7–8.2)
RBC # BLD AUTO: 4.53 X10(6)UL
SODIUM SERPL-SCNC: 140 MMOL/L (ref 136–145)
TRIGL SERPL-MCNC: 81 MG/DL (ref 30–149)
TSI SER-ACNC: 1.86 MIU/ML (ref 0.55–4.78)
VIT D+METAB SERPL-MCNC: 59.4 NG/ML (ref 30–100)
VLDLC SERPL CALC-MCNC: 12 MG/DL (ref 0–30)
WBC # BLD AUTO: 6.7 X10(3) UL (ref 4–11)

## 2023-12-18 PROCEDURE — 80053 COMPREHEN METABOLIC PANEL: CPT

## 2023-12-18 PROCEDURE — 85025 COMPLETE CBC W/AUTO DIFF WBC: CPT

## 2023-12-18 PROCEDURE — 84443 ASSAY THYROID STIM HORMONE: CPT

## 2023-12-18 PROCEDURE — 36415 COLL VENOUS BLD VENIPUNCTURE: CPT

## 2023-12-18 PROCEDURE — 80061 LIPID PANEL: CPT

## 2023-12-18 PROCEDURE — 82306 VITAMIN D 25 HYDROXY: CPT

## 2023-12-18 PROCEDURE — 83036 HEMOGLOBIN GLYCOSYLATED A1C: CPT

## 2024-01-03 ENCOUNTER — NURSE TRIAGE (OUTPATIENT)
Dept: FAMILY MEDICINE CLINIC | Facility: CLINIC | Age: 54
End: 2024-01-03
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
## 2024-01-05 ENCOUNTER — OFFICE VISIT (OUTPATIENT)
Dept: FAMILY MEDICINE CLINIC | Facility: CLINIC | Age: 54
End: 2024-01-05
Payer: MEDICAID

## 2024-01-05 ENCOUNTER — HOSPITAL ENCOUNTER (OUTPATIENT)
Dept: GENERAL RADIOLOGY | Age: 54
Discharge: HOME | End: 2024-01-05
Attending: NURSE PRACTITIONER
Payer: MEDICAID

## 2024-01-05 VITALS
DIASTOLIC BLOOD PRESSURE: 84 MMHG | HEIGHT: 63 IN | HEART RATE: 73 BPM | WEIGHT: 165 LBS | SYSTOLIC BLOOD PRESSURE: 127 MMHG | BODY MASS INDEX: 29.23 KG/M2

## 2024-01-05 DIAGNOSIS — M25.512 ACUTE PAIN OF LEFT SHOULDER: ICD-10-CM

## 2024-01-05 DIAGNOSIS — M54.12 CERVICAL RADICULOPATHY: ICD-10-CM

## 2024-01-05 DIAGNOSIS — M79.602 LEFT ARM PAIN: Primary | ICD-10-CM

## 2024-01-05 DIAGNOSIS — M79.602 LEFT ARM PAIN: ICD-10-CM

## 2024-01-05 PROCEDURE — 3074F SYST BP LT 130 MM HG: CPT | Performed by: NURSE PRACTITIONER

## 2024-01-05 PROCEDURE — 3079F DIAST BP 80-89 MM HG: CPT | Performed by: NURSE PRACTITIONER

## 2024-01-05 PROCEDURE — 72050 X-RAY EXAM NECK SPINE 4/5VWS: CPT | Performed by: NURSE PRACTITIONER

## 2024-01-05 PROCEDURE — 99214 OFFICE O/P EST MOD 30 MIN: CPT | Performed by: NURSE PRACTITIONER

## 2024-01-05 PROCEDURE — 3008F BODY MASS INDEX DOCD: CPT | Performed by: NURSE PRACTITIONER

## 2024-01-05 RX ORDER — CYCLOBENZAPRINE HCL 5 MG
5 TABLET ORAL 3 TIMES DAILY PRN
Qty: 30 TABLET | Refills: 0 | Status: SHIPPED | OUTPATIENT
Start: 2024-01-05

## 2024-01-05 RX ORDER — METHYLPREDNISOLONE 4 MG/1
TABLET ORAL
Qty: 21 EACH | Refills: 0 | Status: SHIPPED | OUTPATIENT
Start: 2024-01-05
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
## 2024-01-20 DIAGNOSIS — E11.65 TYPE 2 DIABETES MELLITUS WITH HYPERGLYCEMIA, WITHOUT LONG-TERM CURRENT USE OF INSULIN (HCC): ICD-10-CM

## 2024-01-22 RX ORDER — DULAGLUTIDE 0.75 MG/.5ML
0.75 INJECTION, SOLUTION SUBCUTANEOUS WEEKLY
Qty: 2 ML | Refills: 3 | Status: SHIPPED | OUTPATIENT
Start: 2024-01-22

## 2024-01-22 RX ORDER — DULAGLUTIDE 0.75 MG/.5ML
0.75 INJECTION, SOLUTION SUBCUTANEOUS WEEKLY
Qty: 2 ML | Refills: 3 | OUTPATIENT
Start: 2024-01-22

## 2024-01-22 NOTE — TELEPHONE ENCOUNTER
Refill passed per [facility] protocol.  Requested Prescriptions   Pending Prescriptions Disp Refills    TRULICITY 0.75 MG/0.5ML Subcutaneous Solution Pen-injector [Pharmacy Med Name: TRULICITY 0.75MG/0.5ML SDP 0.5ML] 2 mL 3     Sig: ADMINISTER 0.75 MG UNDER THE SKIN 1 TIME A WEEK       Diabetes Medication Protocol Passed - 1/20/2024 12:17 PM        Passed - Last A1C < 7.5 and within past 6 months     Lab Results   Component Value Date    A1C 6.0 (H) 12/18/2023             Passed - In person appointment or virtual visit in the past 6 mos or appointment in next 3 mos     Recent Outpatient Visits              [facility]    Office Visit    2 months ago Screening exam for skin cancer    [facility] Precious Patiño PA-C    Office Visit    2 months ago Encounter for annual health examination    [facility] Fatou Johnson MD    Office Visit    3 months ago Type 2 diabetes mellitus with hyperglycemia, without long-term current use of insulin (HCC)    [facility] Fatou Johnson MD    Office Visit    8 months ago Colon cancer screening    [facility] Karl Stanley MD    Office Visit          Future Appointments         Provider Department Appt Notes    In 2 days Michael Copeland, PT [place]  [facility] [place] [facility]    In 1 week Michael Copeland, PT [place]  [facility] [place] AUTH???  BC FHP    In 1 week Michael Copeland, PT [place]  [facility] [place] AUTH???  BC FHP    In 2 weeks Michael Copeland, PT [place]  [facility] [place] AUTH???  BC FHP    In 3 weeks Michael Copeland, PT [place]  [facility] [place] AUTH???  BC FHP    In 3 weeks Michael Copeland, PT [place]  [facility] [place] AUTH???  BC FHP    In 4 weeks Michael Copeland, PT  [place]  [facility] [place] AUTH???  BC FHP    In 1 month Michael Copeland, PT [place]  [facility] [place] AUTH???  BC FHP    In 1 month Armand Rod MD [facility] - OB/GYN annual    In 1 month Aime Aguirre MD [facility] [place] EP/ DM EE    In 4 months Fatou Sanchez MD [facility] seguimiento de control de glucosa               Passed - EGFRCR or GFRNAA > 50     GFR Evaluation  EGFRCR: 107 , resulted on 12/18/2023          Passed - GFR in the past 12 months           Recent Outpatient Visits              2 weeks ago Left arm pain    [facility] Taylor Bishop APRN    Office Visit    2 months ago Screening exam for skin cancer    [facility] Precious Patiño PA-C    Office Visit    2 months ago Encounter for annual health examination    [facility] Fatou Sanchez MD    Office Visit    3 months ago Type 2 diabetes mellitus with hyperglycemia, without long-term current use of insulin (HCC)    [facility] Fatou Sanchez MD    Office Visit    8 months ago Colon cancer screening    [facility] Karl Stanley MD    Office Visit          Future Appointments         Provider Department Appt Notes    In 2 days Michael Copeland, PT [place]  [facility] [place] [facility]  BC FHP    In 1 week Michael Copeland, PT [place]  [facility] [place] AUTH???  BC FHP    In 1 week Michael Copeland, PT [place]  [facility] [place] AUTH???  BC FHP    In 2 weeks Michael Copeland, PT [place]  [facility] [place] AUTH???  BC FHP    In 3 weeks Michael Copeland, PT [place]  [facility] [place] AUTH???  BC FHP    In 3 weeks Michael Copeland, PT [place]  [facility] [place] AUTH???  BC FHP     In 4 weeks Michael Copeland, PT [place]  [facility] [place] AUTH???  BC FHP    In 1 month Michael Copeland, PT [place]  [facility] [place] AUTH???  BC FHP    In 1 month Armand Rod MD [facility] [place] - OB/GYN annual    In 1 month Aime Aguirre MD [facility] [place] EP/ DM EE    In 4 months Fatou Sanchez MD [facility] [place] seguimiento de control de glucosa

## 2024-01-24 ENCOUNTER — OFFICE VISIT (OUTPATIENT)
Dept: PHYSICAL THERAPY | Age: 54
End: 2024-01-24
Attending: NURSE PRACTITIONER
Payer: MEDICAID

## 2024-01-24 DIAGNOSIS — M54.12 CERVICAL RADICULOPATHY: Primary | ICD-10-CM

## 2024-01-24 PROCEDURE — 97161 PT EVAL LOW COMPLEX 20 MIN: CPT | Performed by: PHYSICAL THERAPIST

## 2024-01-24 PROCEDURE — 97110 THERAPEUTIC EXERCISES: CPT | Performed by: PHYSICAL THERAPIST
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
## 2024-01-30 ENCOUNTER — OFFICE VISIT (OUTPATIENT)
Dept: PHYSICAL THERAPY | Age: 54
End: 2024-01-30
Attending: NURSE PRACTITIONER
Payer: MEDICAID

## 2024-01-30 PROCEDURE — 97110 THERAPEUTIC EXERCISES: CPT | Performed by: PHYSICAL THERAPIST

## 2024-01-30 PROCEDURE — 97140 MANUAL THERAPY 1/> REGIONS: CPT | Performed by: PHYSICAL THERAPIST

## 2024-01-30 NOTE — PROGRESS NOTES
Dx: Cervical radiculopathy (M54.12)           Insurance (Authorized # of Visits):  8           Authorizing Physician: Dr. Dario Gorman MD visit: none scheduled  Fall Risk: standard         Precautions: n/a           Medication changes per patient? NO  Date of evaluation/PN: 2024  Pain ratin  Subjective: Arrived painfree and only reported tenderness to (R) parathoracic with STM which resolved at rest.    Objective: Improved postural awareness noted requiring minimal cueing for neutral spine posture with UE functional movements.      Assessment: Improving posture allowing good carry-over of symptom relief from HEP.      Goals: In progress as follows:  Stephanie will consistently centralize symptoms out of the (L)UE to reduce tissue irritability.  Stephanie will consistently abolish symptoms with a home program to initiate healing.  Stephanie will demonstrate consistent postural awareness to allow return to painfree flexion and completion of ADL's below waist level.  Stephanie will be (I) with a home program to allow discharge from PT towards further self-recovery and maintenance of function.    Plan: Check for latent increase in tissue irritability and progress HEP as appropriate.   Date: 2024  TX#:  Date:                 TX#: 3/ Date:                 TX#: 4/ Date:                 TX#: 5/ Date:   Tx#: 6/   THERAPEUTIC EX 45 mins       Scifit UE only  L1 x 4 mins ea fwd/retro focus on upright posture       Doorway pec stretch A and 90/90 3 x 30 secs ea       Wall angels Standing against 1/2 foam roll 3x10       Face pulls Standing against 1/2 foam roll 3x10       Foam roll claps Standing against 1/2 foam roll 3x10       Prone upper back extension 3x10       Prone V, W, horizontal abd 3x10 ea       REIL 2x10                       MANUAL TX 8 mins       STM Paracervicothoracic x 5 mins       Joint mobilization Thoracic PA's Gr 3-4 x 3 mins       HEP: Continue initial HEP    Charges: Therapeutic exercise x 3; Manual  Therapy x 1       Total Timed Treatment: 53 min  Total Treatment Time: 53 min

## 2024-01-31 ENCOUNTER — TELEPHONE (OUTPATIENT)
Dept: FAMILY MEDICINE CLINIC | Facility: CLINIC | Age: 54
End: 2024-01-31

## 2024-01-31 NOTE — TELEPHONE ENCOUNTER
Pt is requesting refill for the following medication      Current Outpatient Medications:     Dulaglutide (TRULICITY) 0.75 MG/0.5ML Subcutaneous Solution Pen-injector, Inject 0.75 mg into the skin once a week., Disp: 2 mL, Rfl: 3

## 2024-02-01 ENCOUNTER — OFFICE VISIT (OUTPATIENT)
Dept: PHYSICAL THERAPY | Age: 54
End: 2024-02-01
Attending: NURSE PRACTITIONER
Payer: MEDICAID

## 2024-02-01 PROCEDURE — 97140 MANUAL THERAPY 1/> REGIONS: CPT | Performed by: PHYSICAL THERAPIST

## 2024-02-01 PROCEDURE — 97110 THERAPEUTIC EXERCISES: CPT | Performed by: PHYSICAL THERAPIST

## 2024-02-01 NOTE — PROGRESS NOTES
Dx: Cervical radiculopathy (M54.12)           Insurance (Authorized # of Visits):  8           Authorizing Physician: Dr. Dario Gorman MD visit: none scheduled  Fall Risk: standard         Precautions: n/a           Medication changes per patient? NO  Date of evaluation/PN: 2024  Pain ratin  Subjective: Continues to be painfree except for ERP with lower cervical extension.    Objective: decreased muscle guarding noted to paracervicals allowing return of WNL cervical SB and rotation.      Assessment: Tolerating progression of scapular strengthening and stabilization to promote improved cervical posture and prepare for load handling.      Goals: In progress as follows:  Stephanie will consistently centralize symptoms out of the (L)UE to reduce tissue irritability.  Stephanie will consistently abolish symptoms with a home program to initiate healing.  Stephanie will demonstrate consistent postural awareness to allow return to painfree flexion and completion of ADL's below waist level.  Stephanie will be (I) with a home program to allow discharge from PT towards further self-recovery and maintenance of function.    Plan: Check for latent increase in tissue irritability and progress HEP as appropriate.   Date: 2024  TX#:  Date: 2023                TX#: 3/8 Date:                 TX#: 4/ Date:                 TX#: 5/ Date:   Tx#: 6/   THERAPEUTIC EX 45 mins 45 mins      Scifit UE only  L1 x 4 mins ea fwd/retro focus on upright posture L2 x 4 mins ea fwd/retro focus on upright posture      Doorway pec stretch A and 90/90 3 x 30 secs ea       Wall angels Standing against 1/2 foam roll 3x10 3x10      Face pulls Standing against 1/2 foam roll 3x10       Foam roll claps Standing against 1/2 foam roll 3x10 Supine 3x10      Prone upper back extension 3x10 3x10      Prone V, W, horizontal abd 3x10 ea 3x10 ea      REIL 2x10 3x10      SB push up plus  RSB 3x10      SB wall walk with swims  RSB 3x10              MANUAL TX 8 mins  8 mins      STM Paracervicothoracic x 5 mins Paracervicothoracic x 5 mins      Joint mobilization Thoracic PA's Gr 3-4 x 3 mins Thoracic PA's Gr 3-4 x 3 mins      HEP: Continue initial HEP    Charges: Therapeutic exercise x 3; Manual Therapy x 1       Total Timed Treatment: 53 min  Total Treatment Time: 53 min

## 2024-02-06 ENCOUNTER — OFFICE VISIT (OUTPATIENT)
Dept: PHYSICAL THERAPY | Age: 54
End: 2024-02-06
Attending: NURSE PRACTITIONER
Payer: MEDICAID

## 2024-02-06 PROCEDURE — 97110 THERAPEUTIC EXERCISES: CPT | Performed by: PHYSICAL THERAPIST

## 2024-02-06 PROCEDURE — 97140 MANUAL THERAPY 1/> REGIONS: CPT | Performed by: PHYSICAL THERAPIST

## 2024-02-06 NOTE — PROGRESS NOTES
Dx: Cervical radiculopathy (M54.12)           Insurance (Authorized # of Visits):  8           Authorizing Physician: Dr. Dario Gorman MD visit: none scheduled  Fall Risk: standard         Precautions: n/a           Medication changes per patient? NO  Date of evaluation/PN: 2024  Pain ratin  Subjective: Reports continuing to be painfree and no symptoms reported     Objective: active CROM WNL and tolerating load handling/pulling without symptom provocation.      Assessment: Returning to function well with symptoms resolving well with good carry-over of relief with HEP compliance.      Goals: In progress as follows:  Stephanie will consistently centralize symptoms out of the (L)UE to reduce tissue irritability.  Stephanie will consistently abolish symptoms with a home program to initiate healing.  Stephanie will demonstrate consistent postural awareness to allow return to painfree flexion and completion of ADL's below waist level.  Stephanie will be (I) with a home program to allow discharge from PT towards further self-recovery and maintenance of function.    Plan: Check for latent increase in tissue irritability and progress HEP as appropriate.   Date: 2024  TX#:  Date: 2023                TX#: 3/8 Date: 2024                TX#:  Date:                 TX#: / Date:   Tx#: 6/   THERAPEUTIC EX 45 mins 45 mins 45 mins     Scifit UE only  L1 x 4 mins ea fwd/retro focus on upright posture L2 x 4 mins ea fwd/retro focus on upright posture L3 x 4 mins ea fwd/retro focus on upright posture     Doorway pec stretch A and 90/90 3 x 30 secs ea       Wall angels Standing against 1/2 foam roll 3x10 3x10      Face pulls Standing against 1/2 foam roll 3x10       Foam roll claps Standing against 1/2 foam roll 3x10 Supine 3x10      Prone upper back extension 3x10 3x10      Prone V, W, horizontal abd 3x10 ea 3x10 ea 3x10 ea     REIL 2x10 3x10 2x10     SB push up plus  RSB 3x10 RSB 3x10     SB wall walk with swims  RSB 3x10  RSB 3x10 with bird dogs     Face pulls   Red cord 3x10     High rows   Red cord 3x10     Low rows with ER   Red cord 3x10     Lat pull downs   Red cord 3x10             MANUAL TX 8 mins 8 mins 8 mins     STM Paracervicothoracic x 5 mins Paracervicothoracic x 5 mins Paracervicothoracic x 5 mins     Joint mobilization Thoracic PA's Gr 3-4 x 3 mins Thoracic PA's Gr 3-4 x 3 mins Thoracic PA's Gr 3-4 x 3 mins     HEP: Continue initial HEP    Charges: Therapeutic exercise x 3; Manual Therapy x 1       Total Timed Treatment: 53 min  Total Treatment Time: 53 min

## 2024-02-07 ENCOUNTER — APPOINTMENT (OUTPATIENT)
Dept: PHYSICAL THERAPY | Age: 54
End: 2024-02-07
Attending: NURSE PRACTITIONER
Payer: MEDICAID

## 2024-02-13 ENCOUNTER — OFFICE VISIT (OUTPATIENT)
Dept: PHYSICAL THERAPY | Age: 54
End: 2024-02-13
Attending: NURSE PRACTITIONER
Payer: MEDICAID

## 2024-02-13 PROCEDURE — 97530 THERAPEUTIC ACTIVITIES: CPT | Performed by: PHYSICAL THERAPIST

## 2024-02-13 PROCEDURE — 97110 THERAPEUTIC EXERCISES: CPT | Performed by: PHYSICAL THERAPIST

## 2024-02-13 NOTE — PROGRESS NOTES
Dx: Cervical radiculopathy (M54.12)           Insurance (Authorized # of Visits):  8           Authorizing Physician: Dr. Dario Gorman MD visit: none scheduled  Fall Risk: standard         Precautions: n/a           Medication changes per patient? NO  Date of evaluation/PN: 2024  Pain ratin  Subjective: No symptom recurrence reported with progression to load handling this session.    Objective: demonstrating proper postural holding with OH lifting up to 2lb ea UE.      Assessment: Returning to function well with symptoms resolving well with good carry-over of relief with HEP compliance.      Goals: In progress as follows:  Stephanie will consistently centralize symptoms out of the (L)UE to reduce tissue irritability.  Stephanie will consistently abolish symptoms with a home program to initiate healing.  Stephanie will demonstrate consistent postural awareness to allow return to painfree flexion and completion of ADL's below waist level.  Stephanie will be (I) with a home program to allow discharge from PT towards further self-recovery and maintenance of function.    Plan: Check response to updated HEP.   Date: 2024  TX#:  Date: 2023                TX#: 3/8 Date: 2024                TX#:  Date: 2024             TX#:  Date:   Tx#: 6/   THERAPEUTIC EX 45 mins 45 mins 45 mins 45 mins    Scifit UE only  L1 x 4 mins ea fwd/retro focus on upright posture L2 x 4 mins ea fwd/retro focus on upright posture L3 x 4 mins ea fwd/retro focus on upright posture L3 x 4 mins ea fwd/retro focus on upright posture    Doorway pec stretch A and 90/90 3 x 30 secs ea       Wall angels Standing against 1/2 foam roll 3x10 3x10      Face pulls Standing against 1/2 foam roll 3x10       Foam roll claps Standing against 1/2 foam roll 3x10 Supine 3x10      Prone upper back extension 3x10 3x10      Prone V, W, horizontal abd 3x10 ea 3x10 ea 3x10 ea     REIL 2x10 3x10 2x10     SB push up plus  RSB 3x10 RSB 3x10 RSB 3x10    SB  wall walk with swims  RSB 3x10 RSB 3x10 with bird dogs RSB with bird dogs    Face pulls   Red cord 3x10 Green cord 3x10    High rows   Red cord 3x10 Green cord 3x10    Low rows with ER   Red cord 3x10 Green cord 3x10    Lat pull downs   Red cord 3x10 Green cord 3x10    (B) shoulder flexion/scaption    1lb with back against foam roll 3x10 ea    scarecrows    Red tband 3x10            THERAPEUTIC ACTIVITY    8 mins    woodchops    15lbs standing on airex beam 3 x 10 ea multi pull    Curl to press    Back against foam roll 2lbs 3x10            MANUAL TX 8 mins 8 mins 8 mins     STM Paracervicothoracic x 5 mins Paracervicothoracic x 5 mins Paracervicothoracic x 5 mins     Joint mobilization Thoracic PA's Gr 3-4 x 3 mins Thoracic PA's Gr 3-4 x 3 mins Thoracic PA's Gr 3-4 x 3 mins     HEP: Refer to patient instructions.    Charges: Therapeutic exercise x 3; Therapeutic activity x 1       Total Timed Treatment: 53 min  Total Treatment Time: 53 min

## 2024-02-15 ENCOUNTER — OFFICE VISIT (OUTPATIENT)
Dept: PHYSICAL THERAPY | Age: 54
End: 2024-02-15
Attending: NURSE PRACTITIONER
Payer: MEDICAID

## 2024-02-15 PROCEDURE — 97110 THERAPEUTIC EXERCISES: CPT | Performed by: PHYSICAL THERAPIST

## 2024-02-15 PROCEDURE — 97530 THERAPEUTIC ACTIVITIES: CPT | Performed by: PHYSICAL THERAPIST

## 2024-02-15 NOTE — PROGRESS NOTES
Dx: Cervical radiculopathy (M54.12)           Insurance (Authorized # of Visits):  8           Authorizing Physician: Dr. Dario Gorman MD visit: none scheduled  Fall Risk: standard         Precautions: n/a           Medication changes per patient? NO  Date of evaluation/PN: 2024  Pain ratin  Subjective: Reports mild transient symptoms to (L) shoulder with increased OH lifting this session.    Objective: Instructed on monitoring duration of shoulder symptoms post-session. Demonstrating consistent postural awareness with OH reaching and lifting and body mechanics with lifting and carrying.    Assessment: Tolerating progression of return to function including lifting/carrying but will need to check for possible impingement to (L) shoulder.      Goals: In progress as follows:  Stephanie will consistently centralize symptoms out of the (L)UE to reduce tissue irritability.  Stephanie will consistently abolish symptoms with a home program to initiate healing.  Stephanie will demonstrate consistent postural awareness to allow return to painfree flexion and completion of ADL's below waist level.  Stephanie will be (I) with a home program to allow discharge from PT towards further self-recovery and maintenance of function.    Plan: Check for duration of (L) shoulder symptoms post-session.   Date: 2024  TX#:  Date: 2023                TX#: 3/8 Date: 2024                TX#:  Date: 2024             TX#:  Date: 2/15/2024  Tx#:    THERAPEUTIC EX 45 mins 45 mins 45 mins 45 mins 45 mins   Scifit UE only  L1 x 4 mins ea fwd/retro focus on upright posture L2 x 4 mins ea fwd/retro focus on upright posture L3 x 4 mins ea fwd/retro focus on upright posture L3 x 4 mins ea fwd/retro focus on upright posture L4 x 4 mins ea fwd/retro focus on upright posture   Doorway pec stretch A and 90/90 3 x 30 secs ea       Wall angels Standing against 1/2 foam roll 3x10 3x10      Face pulls Standing against 1/2 foam roll 3x10        Foam roll claps Standing against 1/2 foam roll 3x10 Supine 3x10      Prone upper back extension 3x10 3x10      Prone V, W, horizontal abd 3x10 ea 3x10 ea 3x10 ea     REIL 2x10 3x10 2x10     SB push up plus  RSB 3x10 RSB 3x10 RSB 3x10 RSB 3x10   SB wall walk with swims  RSB 3x10 RSB 3x10 with bird dogs RSB with bird dogs 3x10 RSB with bird dogs 3x10   Face pulls   Red cord 3x10 Green cord 3x10 Green cord 3x10   High rows   Red cord 3x10 Green cord 3x10 Green cord 3x10 with ER   Low rows with ER   Red cord 3x10 Green cord 3x10 Green cord 3x10   Lat pull downs   Red cord 3x10 Green cord 3x10 Green cord 3x10   (B) shoulder flexion/scaption    1lb with back against foam roll 3x10 ea    scarecrows    Red tband 3x10 Red tband 3x10   Pallof press     Blue cord 3x10           THERAPEUTIC ACTIVITY    8 mins 15 mins   woodchops    15lbs standing on airex beam 3 x 10 ea multi pull 15lbs standing on airex beam 3 x 10 ea multi pull   Curl to press    Back against foam roll 2lbs 3x10    Lift and carry     1 to 5lb dumbells ea UE x 100ft including stairs x 8   Reverse woodchops     Cable wt 3x10 ea           MANUAL TX 8 mins 8 mins 8 mins     STM Paracervicothoracic x 5 mins Paracervicothoracic x 5 mins Paracervicothoracic x 5 mins     Joint mobilization Thoracic PA's Gr 3-4 x 3 mins Thoracic PA's Gr 3-4 x 3 mins Thoracic PA's Gr 3-4 x 3 mins     HEP: Refer to patient instructions.    Charges: Therapeutic exercise x 3; Therapeutic activity x 1       Total Timed Treatment: 50 min  Total Treatment Time: 60 min

## 2024-02-20 ENCOUNTER — OFFICE VISIT (OUTPATIENT)
Dept: PHYSICAL THERAPY | Age: 54
End: 2024-02-20
Attending: NURSE PRACTITIONER
Payer: MEDICAID

## 2024-02-20 PROCEDURE — 97110 THERAPEUTIC EXERCISES: CPT | Performed by: PHYSICAL THERAPIST

## 2024-02-20 PROCEDURE — 97530 THERAPEUTIC ACTIVITIES: CPT | Performed by: PHYSICAL THERAPIST

## 2024-02-20 NOTE — PROGRESS NOTES
Dx: Cervical radiculopathy (M54.12)           Insurance (Authorized # of Visits):  8           Authorizing Physician: Dr. Dario Gorman MD visit: none scheduled  Fall Risk: standard         Precautions: n/a           Medication changes per patient? NO  Date of evaluation/PN: 2024  Pain ratin/10 cervical   Subjective: No shoulder symptoms reported but arrived with mild cervical pain which was abolished with end-range lower cervical extension in sitting.    Objective: No symptom provocation with load handling this session and symptom recurrence managed well with lower cervical extension with self-overpressure. Instructed on perception-reaction time and regular HEP performance of cervical retraction with extension in sitting to maintain symptoms abolished.    Assessment: No signs of shoulder impingement with good tolerance to load management including overhead but will need continued compliance with lower cervical extension program for continued symptom control and tissue healing/recovery.      Goals: In progress as follows:  Stephanie will consistently centralize symptoms out of the (L)UE to reduce tissue irritability.  Stephanie will consistently abolish symptoms with a home program to initiate healing.  Stephanie will demonstrate consistent postural awareness to allow return to painfree flexion and completion of ADL's below waist level.  Stephanie will be (I) with a home program to allow discharge from PT towards further self-recovery and maintenance of function.    Plan: Continue x 1 session and DC with (I) HEP.   Date: 2024  TX#:  Date: 2023                TX#: 3/8 Date: 2024                TX#:  Date: 2024             TX#:  Date: 2/15/2024  Tx#:  Date: 2024  Tx#:    THERAPEUTIC EX 45 mins 45 mins 45 mins 45 mins 45 mins 25 mins   Scifit UE only  L1 x 4 mins ea fwd/retro focus on upright posture L2 x 4 mins ea fwd/retro focus on upright posture L3 x 4 mins ea fwd/retro focus on  upright posture L3 x 4 mins ea fwd/retro focus on upright posture L4 x 4 mins ea fwd/retro focus on upright posture L5 x 4 mins ea fwd/retro focus on upright posture   Doorway pec stretch A and 90/90 3 x 30 secs ea        Wall angels Standing against 1/2 foam roll 3x10 3x10       Face pulls Standing against 1/2 foam roll 3x10        Foam roll claps Standing against 1/2 foam roll 3x10 Supine 3x10       Prone upper back extension 3x10 3x10       Prone V, W, horizontal abd 3x10 ea 3x10 ea 3x10 ea      REIL 2x10 3x10 2x10      SB push up plus  RSB 3x10 RSB 3x10 RSB 3x10 RSB 3x10 RSB 3x10   SB wall walk with swims  RSB 3x10 RSB 3x10 with bird dogs RSB with bird dogs 3x10 RSB with bird dogs 3x10 RSB with bird dogs 3x10   Face pulls   Red cord 3x10 Green cord 3x10 Green cord 3x10    High rows   Red cord 3x10 Green cord 3x10 Green cord 3x10 with ER    Low rows with ER   Red cord 3x10 Green cord 3x10 Green cord 3x10    Lat pull downs   Red cord 3x10 Green cord 3x10 Green cord 3x10    (B) shoulder flexion/scaption    1lb with back against foam roll 3x10 ea  2lb with back against foam roll 3x10 ea   scarecrows    Red tband 3x10 Red tband 3x10    Pallof press     Blue cord 3x10             THERAPEUTIC ACTIVITY    8 mins 15 mins 30 mins   woodchops    15lbs standing on airex beam 3 x 10 ea multi pull 15lbs standing on airex beam 3 x 10 ea multi pull 20lbs standing on airex beam 3x10 ea multi pull   Curl to press    Back against foam roll 2lbs 3x10  Back against foam roll 3lbs 3x10   Lift and carry     1 to 5lb dumbells ea UE x 100ft including stairs x 8 6 to 15lb dumbbells (B) with 100ft carry including stairs x 8   Reverse woodchops     Cable wt 3x10 ea 5lbs cable wt 3x10   Sled push      10 x 60ft   Box lift      Floor to chair to waist 20lbs x 10            MANUAL TX 8 mins 8 mins 8 mins      STM Paracervicothoracic x 5 mins Paracervicothoracic x 5 mins Paracervicothoracic x 5 mins      Joint mobilization Thoracic PA's Gr 3-4  x 3 mins Thoracic PA's Gr 3-4 x 3 mins Thoracic PA's Gr 3-4 x 3 mins      HEP: Cervical retraction with ext in sitting x 10 every 2 hours and as soon as symptoms recur in between.    Charges: Therapeutic exercise x 2; Therapeutic activity x 2       Total Timed Treatment: 55 min  Total Treatment Time: 55 min

## 2024-02-22 ENCOUNTER — OFFICE VISIT (OUTPATIENT)
Dept: PHYSICAL THERAPY | Age: 54
End: 2024-02-22
Attending: NURSE PRACTITIONER
Payer: MEDICAID

## 2024-02-22 PROCEDURE — 97530 THERAPEUTIC ACTIVITIES: CPT | Performed by: PHYSICAL THERAPIST

## 2024-02-22 PROCEDURE — 97110 THERAPEUTIC EXERCISES: CPT | Performed by: PHYSICAL THERAPIST

## 2024-02-22 NOTE — PROGRESS NOTES
Discharge Summary  Pt has attended 8 visits in Physical Therapy.     Dx: Cervical radiculopathy (M54.12)           Insurance (Authorized # of Visits):  8           Authorizing Physician: Dr. Dario Gorman MD visit: none scheduled  Fall Risk: standard         Precautions: n/a           Medication changes per patient? NO  Date of evaluation/PN: 2024  Pain ratin/10 cervical   Assessment: Stephanie has now completed her course of physical therapy with resolution of her symptoms allowing her to return to normal functional use of her Ue's including load handling. She demonstrates improved postural awareness and neck/back mechanics with functional lifting to allow her to maintain her gains in PT and avoid further injury    Subjective: Painfree    Objective: Demonstrating load handling ability to 10lbs including overhead. WNL and painfree (B)UE AROM and active CROM; (B)UE strength grossly 4+ to 5/5.      Goals: MET as follows:  Stephanie will consistently centralize symptoms out of the (L)UE to reduce tissue irritability.  Stephanie will consistently abolish symptoms with a home program to initiate healing.  Stephanie will demonstrate consistent postural awareness to allow return to painfree flexion and completion of ADL's below waist level.  Stephanie will be (I) with a home program to allow discharge from PT towards further self-recovery and maintenance of function.    Treatment:   Date: 2023                TX#: 3/8 Date: 2024                TX#:  Date: 2024             TX#:  Date: 2/15/2024  Tx#:  Date: 2024  Tx#:  Date: 2024  Tx#:    THERAPEUTIC EX 45 mins 45 mins 45 mins 45 mins 25 mins 25 mins   Scifit UE only  L2 x 4 mins ea fwd/retro focus on upright posture L3 x 4 mins ea fwd/retro focus on upright posture L3 x 4 mins ea fwd/retro focus on upright posture L4 x 4 mins ea fwd/retro focus on upright posture L5 x 4 mins ea fwd/retro focus on upright posture L5 x 4 mins ea fwd/retro focus on  upright posture   Doorway pec stretch         Wall angels 3x10        Face pulls         Foam roll claps Supine 3x10     Supine 3x10   Prone upper back extension 3x10        Prone V, W, horizontal abd 3x10 ea 3x10 ea       REIL 3x10 2x10       SB push up plus RSB 3x10 RSB 3x10 RSB 3x10 RSB 3x10 RSB 3x10    SB wall walk with swims RSB 3x10 RSB 3x10 with bird dogs RSB with bird dogs 3x10 RSB with bird dogs 3x10 RSB with bird dogs 3x10    Face pulls  Red cord 3x10 Green cord 3x10 Green cord 3x10     High rows  Red cord 3x10 Green cord 3x10 Green cord 3x10 with ER     Low rows with ER  Red cord 3x10 Green cord 3x10 Green cord 3x10     Lat pull downs  Red cord 3x10 Green cord 3x10 Green cord 3x10     (B) shoulder flexion/scaption   1lb with back against foam roll 3x10 ea  2lb with back against foam roll 3x10 ea    scarecrows   Red tband 3x10 Red tband 3x10  Blue tband 3x10   Pallof press    Blue cord 3x10     Quadruped UE foam roll crawl      3x10   Supine foam roll swims      3x10   Supine foam roll W      3x10            THERAPEUTIC ACTIVITY   8 mins 15 mins 30 mins 30 mins   woodchops   15lbs standing on airex beam 3 x 10 ea multi pull 15lbs standing on airex beam 3 x 10 ea multi pull 20lbs standing on airex beam 3x10 ea multi pull    Curl to press   Back against foam roll 2lbs 3x10  Back against foam roll 3lbs 3x10    Lift and carry    1 to 5lb dumbells ea UE x 100ft including stairs x 8 6 to 15lb dumbbells (B) with 100ft carry including stairs x 8 6 to 15lb dumbbells (B) with 100ft carry including stairs x 8   Reverse woodchops    Cable wt 3x10 ea 5lbs cable wt 3x10    Sled push     10 x 60ft    Box lift     Floor to chair to waist 20lbs x 10    Shelf transfers      10lbs waist to overhead x 15   Cable retrowalk      20lbs 3x10   MANUAL TX 8 mins 8 mins       STM Paracervicothoracic x 5 mins Paracervicothoracic x 5 mins       Joint mobilization Thoracic PA's Gr 3-4 x 3 mins Thoracic PA's Gr 3-4 x 3 mins       HEP:  Refer to patient instructions    Charges: Therapeutic exercise x 2; Therapeutic activity x 2       Total Timed Treatment: 55 min  Total Treatment Time: 55 min  Post Neck Disability Index Score  Post Score: 0 % (2/22/2024 11:26 AM)    14 % improvement    Plan: Discharge with (I) HEP.    Thank you for your referral. If you have any questions, please contact me at Dept: [phone].    Sincerely,  Electronically signed by therapist: Michael Copeland PT

## 2024-03-18 ENCOUNTER — OFFICE VISIT (OUTPATIENT)
Dept: OBGYN CLINIC | Facility: CLINIC | Age: 54
End: 2024-03-18
Payer: MEDICAID

## 2024-03-18 VITALS
SYSTOLIC BLOOD PRESSURE: 129 MMHG | HEART RATE: 72 BPM | DIASTOLIC BLOOD PRESSURE: 82 MMHG | BODY MASS INDEX: 30 KG/M2 | WEIGHT: 167 LBS

## 2024-03-18 DIAGNOSIS — N95.1 PERIMENOPAUSE: Primary | ICD-10-CM

## 2024-03-18 DIAGNOSIS — Z01.419 ENCOUNTER FOR WELL WOMAN EXAM: ICD-10-CM

## 2024-03-18 DIAGNOSIS — Z12.31 BREAST CANCER SCREENING BY MAMMOGRAM: ICD-10-CM

## 2024-03-18 PROCEDURE — 99396 PREV VISIT EST AGE 40-64: CPT | Performed by: OBSTETRICS & GYNECOLOGY

## 2024-03-18 NOTE — PROGRESS NOTES
HPI:    Patient ID: Stephanie Rodriguez is a 54 year old year old female.    HPI  Well woman visit  54-year-old female  3 para 3 last menstrual period 2023.  History of Mirena IUD replaced last year 2023.  Will check annual FSH for onset of menopause.  Had normal screening mammogram on .  Review of Systems   Constitutional: Negative.    Cardiovascular: Negative.    Gastrointestinal: Negative.    Genitourinary: Negative.    Skin: Negative.    Neurological: Negative.    Psychiatric/Behavioral: Negative.     All other systems reviewed and are negative.       Current Outpatient Medications   Medication Sig Dispense Refill    Dulaglutide (TRULICITY) 0.75 MG/0.5ML Subcutaneous Solution Pen-injector Inject 0.75 mg into the skin once a week. 2 mL 3    methylPREDNISolone (MEDROL) 4 MG Oral Tablet Therapy Pack As directed. 21 each 0    cyclobenzaprine 5 MG Oral Tab Take 1 tablet (5 mg total) by mouth 3 (three) times daily as needed for Muscle spasms. 30 tablet 0    losartan 100 MG Oral Tab Take 1 tablet (100 mg total) by mouth every morning. 90 tablet 3    pneumococcal 20-Nubia conj vacc (PREVNAR 20) 0.5 ML Intramuscular Suspension Prefilled Syringe Inject 0.5 mL into the muscle Prior to discharge. 0.5 mL 0    atorvastatin 20 MG Oral Tab Take 1 tablet (20 mg total) by mouth nightly. 90 tablet 3    hydroCHLOROthiazide 12.5 MG Oral Cap Take 1 capsule (12.5 mg total) by mouth daily. 90 capsule 3    metFORMIN HCl 1000 MG Oral Tab Take 1 tablet (1,000 mg total) by mouth 2 (two) times daily with meals. 180 tablet 3    calcium carbonate-vitamin D 250-125 MG-UNIT Oral Tab Take 1 tablet by mouth 2 (two) times daily with meals. (Patient not taking: Reported on 2023)      pantoprazole 40 MG Oral Tab EC Take 1 tablet (40 mg total) by mouth every morning before breakfast. PRN      Calcium 250 MG Oral Cap Take 1 capsule by mouth daily. (Patient not taking: Reported on 2023) 90 capsule 3       Past  Medical History:   Diagnosis Date    Diabetes (HCC)     Essential hypertension     High blood pressure     High cholesterol     Hyperlipidemia     PONV (postoperative nausea and vomiting)     Visual impairment        Past Surgical History:   Procedure Laterality Date          COLONOSCOPY N/A 10/5/2023    Procedure: COLONOSCOPY with polypectomy;  Surgeon: Karl Stanley MD;  Location: [facility]       Family History   Problem Relation Age of Onset    Diabetes Mother     Hypertension Mother     Breast Cancer Maternal Aunt     Glaucoma Neg     Macular degeneration Neg        Social History     Socioeconomic History    Marital status:      Spouse name: Not on file    Number of children: Not on file    Years of education: Not on file    Highest education level: Not on file   Occupational History    Not on file   Tobacco Use    Smoking status: Never     Passive exposure: Never    Smokeless tobacco: Never   Vaping Use    Vaping Use: Never used   Substance and Sexual Activity    Alcohol use: Never    Drug use: Never    Sexual activity: Not on file   Other Topics Concern    Grew up on a farm No    History of tanning No    Outdoor occupation No    Breast feeding No    Reaction to local anesthetic No    Pt has a pacemaker No    Pt has a defibrillator No   Social History Narrative    Not on file     Social Determinants of Health     Financial Resource Strain: Not on file   Food Insecurity: Not on file   Transportation Needs: Not on file   Physical Activity: Not on file   Stress: Not on file   Social Connections: Not on file   Housing Stability: Not on file       Physical Exam     Vitals: LMP 10/23/2023 (Exact Date)     Constitutional: She appears well-developed and well-nourished.     Musculoskeletal: Normal range of motion of upper and lower extremities.   Neurological: She is alert and oriented x 3.   Skin: Skin is warm without pallor.  Psychiatric: Her behavior is normal. Judgment normal.  Able to communicate  verbally.    HEENT:  EOMI.  MANUEL.  Sclera anicteric.    Head: Normocephalic.  Normal hair distribution.  No lesions.  Neck: Normal range of motion.      Adenopathy:  No supraclavicular or cervical adenopathy.  Thyroid:  Normal size, shape, and position.  No masses, tenderness, or nodules.  Cardiovascular: Normal rate and regular rhythm.    Pulmonary/Chest: Effort normal.   Abdominal: Soft. Normal appearance and bowel sounds are normal. She exhibits no mass. There is no hepatosplenomegaly. There is no tenderness. There is no rebound and no CVA tenderness. No hernia. Hernia negative in the ventral area,  negative in the right inguinal area and negative in the left inguinal area.   Lymphadenopathy:        Right: No inguinal adenopathy present.        Left: No inguinal adenopathy present.     Breasts:    Symmetric bilaterally.  Areolas without lesions.  Skin- normal without growths, lesions, erythema or peau d'orange change.  Nipples- without retraction or discharge.  No masses, lumps, skin changes, erythema, or lesions.  Axilla-  No adenopathy, mass, or tenderness.    Genitourinary:   Pelvic exam was performed with patient supine and chaperone present.  External genitalia- normal.  Bartholin's and Osco's glands normal.  Urethral meatus- without lesions, mass, or discharge.  Urethra- normal without lesion, cyst, mass, or tenderness.  Vulva- normal.  Labia majora and minora without lesions.   Vagina- normal, no lesions or discharge.  Moist and well supported.  Bladder-  nontender.  No masses.  Normal support.  No evidence of cystocele,  abnormal bladder neck mobility or evident urinary incontinence.  Cervix-IUD string visible; smooth, normal epithelium without lesions or discharge.  No motion tenderness.   Uterus- normal size, shape, and contour.  Nontender.  No masses.  Adnexa-  Nontender, no masses.   Perineum- normal without lesions  Anus-  Normal appearing without lesions.    ASSESSMENT/PLAN:  Well woman visit  IUD  in place  Perimenopause- check fsh    Normal exam.  Pap test every 3 years if prior normal/-HPV.  Monthly self breast exam.  Annual screening mammograms.  Recommend screening colonoscopy at age 50, or as advised by GI.  Recommend dexascan for osteoporosis screening as indicated.  Recommend regular exercise and quality diet.  Return to clinic in one year or as needed.       Outpatient Encounter Medications as of 3/18/2024   Medication Sig Dispense Refill    Dulaglutide (TRULICITY) 0.75 MG/0.5ML Subcutaneous Solution Pen-injector Inject 0.75 mg into the skin once a week. 2 mL 3    methylPREDNISolone (MEDROL) 4 MG Oral Tablet Therapy Pack As directed. 21 each 0    cyclobenzaprine 5 MG Oral Tab Take 1 tablet (5 mg total) by mouth 3 (three) times daily as needed for Muscle spasms. 30 tablet 0    losartan 100 MG Oral Tab Take 1 tablet (100 mg total) by mouth every morning. 90 tablet 3    pneumococcal 20-Nubia conj vacc (PREVNAR 20) 0.5 ML Intramuscular Suspension Prefilled Syringe Inject 0.5 mL into the muscle Prior to discharge. 0.5 mL 0    atorvastatin 20 MG Oral Tab Take 1 tablet (20 mg total) by mouth nightly. 90 tablet 3    hydroCHLOROthiazide 12.5 MG Oral Cap Take 1 capsule (12.5 mg total) by mouth daily. 90 capsule 3    metFORMIN HCl 1000 MG Oral Tab Take 1 tablet (1,000 mg total) by mouth 2 (two) times daily with meals. 180 tablet 3    calcium carbonate-vitamin D 250-125 MG-UNIT Oral Tab Take 1 tablet by mouth 2 (two) times daily with meals. (Patient not taking: Reported on 11/6/2023)      pantoprazole 40 MG Oral Tab EC Take 1 tablet (40 mg total) by mouth every morning before breakfast. PRN      Calcium 250 MG Oral Cap Take 1 capsule by mouth daily. (Patient not taking: Reported on 11/6/2023) 90 capsule 3     No facility-administered encounter medications on file as of 3/18/2024.

## 2024-03-19 ENCOUNTER — OFFICE VISIT (OUTPATIENT)
Dept: OPHTHALMOLOGY | Facility: CLINIC | Age: 54
End: 2024-03-19
Payer: MEDICAID

## 2024-03-19 ENCOUNTER — TELEPHONE (OUTPATIENT)
Dept: FAMILY MEDICINE CLINIC | Facility: CLINIC | Age: 54
End: 2024-03-19

## 2024-03-19 ENCOUNTER — LAB ENCOUNTER (OUTPATIENT)
Dept: LAB | Age: 54
End: 2024-03-19
Attending: OBSTETRICS & GYNECOLOGY
Payer: MEDICAID

## 2024-03-19 DIAGNOSIS — N95.1 PERIMENOPAUSE: ICD-10-CM

## 2024-03-19 DIAGNOSIS — H43.393 FLOATERS IN VISUAL FIELD, BILATERAL: ICD-10-CM

## 2024-03-19 DIAGNOSIS — E11.9 DIABETES MELLITUS TYPE 2 WITHOUT RETINOPATHY (HCC): Primary | ICD-10-CM

## 2024-03-19 DIAGNOSIS — H25.13 AGE-RELATED NUCLEAR CATARACT OF BOTH EYES: ICD-10-CM

## 2024-03-19 LAB
FSH SERPL-ACNC: 1.6 MIU/ML
HPV I/H RISK 1 DNA SPEC QL NAA+PROBE: NEGATIVE

## 2024-03-19 PROCEDURE — 83001 ASSAY OF GONADOTROPIN (FSH): CPT

## 2024-03-19 PROCEDURE — 36415 COLL VENOUS BLD VENIPUNCTURE: CPT

## 2024-03-19 PROCEDURE — 92015 DETERMINE REFRACTIVE STATE: CPT | Performed by: OPHTHALMOLOGY

## 2024-03-19 PROCEDURE — 92014 COMPRE OPH EXAM EST PT 1/>: CPT | Performed by: OPHTHALMOLOGY

## 2024-03-19 NOTE — PATIENT INSTRUCTIONS
Diabetes mellitus type 2 without retinopathy (HCC)  Diabetes type II: no background of retinopathy, no signs of neovascularization noted.  Discussed ocular and systemic benefits of blood sugar control.  Diagnosis and treatment discussed in detail with patient.    Will see patient in 1 year for a diabetic exam    Age-related nuclear cataract of both eyes  Discussed early cataracts with patient. Told patient that cataracts are age appropriate and they are not surgical at this time.  No treatment recommended at this time.     New glasses Rx given today, update as needed    Floaters in visual field, bilateral   There is no evidence of retinal pathology.  All signs and symptoms of retinal detachment/tears explained in detail.    Patient instructed to call the office if they experience increase in floaters, increase in flashes of light, loss of vision or curtain or veil effect.

## 2024-03-19 NOTE — TELEPHONE ENCOUNTER
Patient calling to inform that she was told by the pharmacy that they are out of stock of Trulicity and do not know when it will be in stock. She would like to know what to do regarding this, as she is due for her injection, please advise.

## 2024-03-19 NOTE — PROGRESS NOTES
Stephanie Rodriguez is a 54 year old female.    HPI:     HPI    Pt is here for diabetic eye exam.  Pt denies vision changes.    Pt has been a diabetic for 7 years       Pt's diabetes is currently controlled by pills and injectable  Pt checks BS once a day   Pt's last blood sugar was  100 this morning  Last HA1C was 6.0 on 23  Endocrinologist: none        Last edited by Argentina Hong OVASYL on 3/19/2024 10:19 AM.        Patient History:  Past Medical History:   Diagnosis Date    Diabetes (HCC)     Essential hypertension     High blood pressure     High cholesterol     Hyperlipidemia     PONV (postoperative nausea and vomiting)     Visual impairment        Surgical History: Stephanie Rodriguez has a past surgical history that includes  and colonoscopy (N/A, 10/5/2023) (Procedure: COLONOSCOPY with polypectomy;  Surgeon: Karl Stanley MD;  Location: [facility]).    Family History   Problem Relation Age of Onset    Diabetes Mother     Hypertension Mother     Breast Cancer Maternal Aunt     Glaucoma Neg     Macular degeneration Neg        Social History:   Social History     Socioeconomic History    Marital status:    Tobacco Use    Smoking status: Never     Passive exposure: Never    Smokeless tobacco: Never   Vaping Use    Vaping Use: Never used   Substance and Sexual Activity    Alcohol use: Never    Drug use: Never   Other Topics Concern    Grew up on a farm No    History of tanning No    Outdoor occupation No    Breast feeding No    Reaction to local anesthetic No    Pt has a pacemaker No    Pt has a defibrillator No       Medications:  Current Outpatient Medications   Medication Sig Dispense Refill    Dulaglutide (TRULICITY) 0.75 MG/0.5ML Subcutaneous Solution Pen-injector Inject 0.75 mg into the skin once a week. 2 mL 3    losartan 100 MG Oral Tab Take 1 tablet (100 mg total) by mouth every morning. 90 tablet 3    atorvastatin 20 MG Oral Tab Take 1 tablet (20 mg total) by mouth nightly. 90  tablet 3    hydroCHLOROthiazide 12.5 MG Oral Cap Take 1 capsule (12.5 mg total) by mouth daily. 90 capsule 3    metFORMIN HCl 1000 MG Oral Tab Take 1 tablet (1,000 mg total) by mouth 2 (two) times daily with meals. 180 tablet 3    calcium carbonate-vitamin D 250-125 MG-UNIT Oral Tab Take 1 tablet by mouth 2 (two) times daily with meals.      pantoprazole 40 MG Oral Tab EC Take 1 tablet (40 mg total) by mouth every morning before breakfast. PRN      Calcium 250 MG Oral Cap Take 1 capsule by mouth daily. 90 capsule 3    pneumococcal 20-Nubia conj vacc (PREVNAR 20) 0.5 ML Intramuscular Suspension Prefilled Syringe Inject 0.5 mL into the muscle Prior to discharge. (Patient not taking: Reported on 3/18/2024) 0.5 mL 0       Allergies:  No Known Allergies    ROS:     ROS    Positive for: Endocrine  Negative for: Constitutional, Gastrointestinal, Neurological, Skin, Genitourinary, Musculoskeletal, HENT, Cardiovascular, Eyes, Respiratory, Psychiatric, Allergic/Imm, Heme/Lymph  Last edited by Argentina Hong, O.T. on 3/19/2024 10:19 AM.          PHYSICAL EXAM:     Base Eye Exam       Visual Acuity (Snellen - Linear)         Right Left    Dist cc 20/30 -2 20/30 -1    Dist ph cc 20/20 -2 20/25 -2    Near cc 20/30 20/30      Correction: Glasses              Tonometry (Icare, 10:31 AM)         Right Left    Pressure 17 15              Neuro/Psych       Oriented x3: Yes              Dilation       Both eyes: 1.0% Mydriacyl and 2.5% Tobias Synephrine @ 10:31 AM                  Slit Lamp and Fundus Exam       Slit Lamp Exam         Right Left    Lids/Lashes Dermatochalasis, Meibomian gland dysfunction Dermatochalasis, Meibomian gland dysfunction    Conjunctiva/Sclera Nasal/temp pinguecula Nasal/temp pinguecula    Cornea Clear Clear    Anterior Chamber Deep and quiet Deep and quiet    Iris Normal Normal    Lens Trace Nuclear sclerosis Trace Nuclear sclerosis    Vitreous Vitreous floaters Vitreous floaters              Fundus Exam          Right Left    Disc Good rim, Temporal crescent Good rim, Temporal crescent    C/D Ratio 0.35 0.4    Macula Normal, no BDR Normal, no BDR    Vessels Normal Normal    Periphery Normal Normal                  Refraction       Wearing Rx         Sphere Cylinder Axis Add    Right -0.25 +0.50 115 +1.25    Left -0.25 +1.00 080 +1.25      Age: 6yrs    Type: Progressive bifocal              Manifest Refraction         Sphere Cylinder [place] Dist VA Add Near VA    Right +0.25 +0.50 115 20/20- +2.25 20/20    Left [place] +0.75 080 20/25- +2.25 20/20              Final Rx         Sphere Cylinder [place] Dist VA Add Near VA    Right +0.25 +0.50 115 20/20- +2.25 20/20    Left [place] +0.75 080 20/25- +2.25 20/20      Type: Progressive bifocal                     ASSESSMENT/PLAN:     Diagnoses and Plan:     Diabetes mellitus type 2 without retinopathy (HCC)  Diabetes type II: no background of retinopathy, no signs of neovascularization noted.  Discussed ocular and systemic benefits of blood sugar control.  Diagnosis and treatment discussed in detail with patient.    Will see patient in 1 year for a diabetic exam    Age-related nuclear cataract of both eyes  Discussed early cataracts with patient. Told patient that cataracts are age appropriate and they are not surgical at this time.  No treatment recommended at this time.     New glasses Rx given today, update as needed    Floaters in visual field, bilateral   There is no evidence of retinal pathology.  All signs and symptoms of retinal detachment/tears explained in detail.    Patient instructed to call the office if they experience increase in floaters, increase in flashes of light, loss of vision or curtain or veil effect.       No orders of the defined types were placed in this encounter.      Meds This Visit:  Requested Prescriptions      No prescriptions requested or ordered in this encounter        Follow up instructions:  Return in about 1 year (around 3/19/2025) for Diabetic eye  exam.    3/19/2024  Scribed by: Aime Aguirre MD

## 2024-03-19 NOTE — TELEPHONE ENCOUNTER
Calling pharmacy. Per pharmacist he did state some doses are out of stock however Trulicity 0.75mg will be available tomorrow.     Patient contacted and verbalized understanding.

## 2024-03-19 NOTE — ASSESSMENT & PLAN NOTE
Discussed early cataracts with patient. Told patient that cataracts are age appropriate and they are not surgical at this time.  No treatment recommended at this time.     New glasses Rx given today, update as needed   Patient has been discharged  No need to follow with General Surgery

## 2024-04-15 DIAGNOSIS — E11.65 TYPE 2 DIABETES MELLITUS WITH HYPERGLYCEMIA, WITHOUT LONG-TERM CURRENT USE OF INSULIN (HCC): ICD-10-CM

## 2024-04-16 RX ORDER — DULAGLUTIDE 0.75 MG/.5ML
0.75 INJECTION, SOLUTION SUBCUTANEOUS WEEKLY
Qty: 2 ML | Refills: 3 | OUTPATIENT
Start: 2024-04-16

## 2024-05-08 DIAGNOSIS — E11.65 TYPE 2 DIABETES MELLITUS WITH HYPERGLYCEMIA, WITHOUT LONG-TERM CURRENT USE OF INSULIN (HCC): ICD-10-CM

## 2024-05-09 RX ORDER — DULAGLUTIDE 0.75 MG/.5ML
0.75 INJECTION, SOLUTION SUBCUTANEOUS
Qty: 2 ML | Refills: 3 | Status: SHIPPED | OUTPATIENT
Start: 2024-05-09

## 2024-05-09 NOTE — TELEPHONE ENCOUNTER
Dr. Sanchez - just clarifying if okay to continue current dose? Trulicity was alternatively prescribed due to availability of medication.       Refill passed per protocol.    Requested Prescriptions   Pending Prescriptions Disp Refills    TRULICITY 0.75 MG/0.5ML Subcutaneous Solution Pen-injector [Pharmacy Med Name: TRULICITY 0.75MG/0.5ML SDP 0.5ML] 2 mL 3     Sig: ADMINISTER 0.75 MG UNDER THE SKIN 1 TIME A WEEK       Diabetes Medication Protocol Passed - 5/8/2024  2:14 PM        Passed - Last A1C < 7.5 and within past 6 months     Lab Results   Component Value Date    A1C 6.0 (H) 12/18/2023             Passed - In person appointment or virtual visit in the past 6 mos or appointment in next 3 mos     Recent Outpatient Visits              1 month ago Diabetes mellitus type 2 without retinopathy (HCC)    [facility] Aime Aguirre MD    Office Visit    1 month ago Perimenopause    [facility] - OB/GYN Armand Rod MD    Office Visit    2 months ago     [place]  [facility], PT    Office Visit    2 months ago     [place] [facility] PT    Office Visit    2 months ago     [place] [facility], [facility]    Office Visit          Future Appointments         Provider Department Appt Notes    In 3 weeks Fatou Sanchez MD [facility] seguimiento de control de glucosa    In 4 months ADO DEXA RM1; ADO CAESAR RM1 [facility]     In 10 months Aime Aguirre MD [facility] CFM/EP/ DM EE    In 10 months Armand Rod MD [facility] - OB/GYN annual                    Passed - Microalbumin procedure in past 12 months or taking ACE/ARB        Passed - EGFRCR or GFRNAA > 50     GFR Evaluation  EGFRCR: 107 , resulted on  12/18/2023          Passed - GFR in the past 12 months           Recent Outpatient Visits              1 month ago Diabetes mellitus type 2 without retinopathy (HCC)    [facility] Aime Aguirre MD    Office Visit    1 month ago Perimenopause    [facility] - OB/GYN Armand Rod MD    Office Visit    2 months ago     [place]  [facility], PT    Office Visit    2 months ago     [place]  [facility], PT    Office Visit    2 months ago     [place]  [facility], PT    Office Visit          Future Appointments         Provider Department Appt Notes    In 3 weeks Fatou Sanchez MD [facility] seguimiento de control de glucosa    In 4 months ADO DEXA RM1; ADO CAESAR RM1 [facility]     In 10 months Aime Aguirre MD [facility] CFM/EP/ DM EE    In 10 months Armand Rod MD [facility] - OB/GYN annual

## 2024-06-04 ENCOUNTER — OFFICE VISIT (OUTPATIENT)
Dept: FAMILY MEDICINE CLINIC | Facility: CLINIC | Age: 54
End: 2024-06-04
Payer: COMMERCIAL

## 2024-06-04 VITALS
SYSTOLIC BLOOD PRESSURE: 132 MMHG | WEIGHT: 166 LBS | BODY MASS INDEX: 29 KG/M2 | HEART RATE: 72 BPM | DIASTOLIC BLOOD PRESSURE: 82 MMHG

## 2024-06-04 DIAGNOSIS — K21.9 GASTROESOPHAGEAL REFLUX DISEASE, UNSPECIFIED WHETHER ESOPHAGITIS PRESENT: ICD-10-CM

## 2024-06-04 DIAGNOSIS — E78.2 MIXED HYPERLIPIDEMIA: ICD-10-CM

## 2024-06-04 DIAGNOSIS — E11.65 TYPE 2 DIABETES MELLITUS WITH HYPERGLYCEMIA, WITHOUT LONG-TERM CURRENT USE OF INSULIN (HCC): ICD-10-CM

## 2024-06-04 DIAGNOSIS — I10 ESSENTIAL HYPERTENSION: Primary | ICD-10-CM

## 2024-06-04 LAB — HEMOGLOBIN A1C: 6.2 % (ref 4.3–5.6)

## 2024-06-04 PROCEDURE — 83036 HEMOGLOBIN GLYCOSYLATED A1C: CPT | Performed by: FAMILY MEDICINE

## 2024-06-04 PROCEDURE — 99214 OFFICE O/P EST MOD 30 MIN: CPT | Performed by: FAMILY MEDICINE

## 2024-06-04 RX ORDER — DULAGLUTIDE 1.5 MG/.5ML
1.5 INJECTION, SOLUTION SUBCUTANEOUS WEEKLY
Qty: 0.5 ML | Refills: 3 | Status: SHIPPED | OUTPATIENT
Start: 2024-06-04

## 2024-06-04 RX ORDER — DULAGLUTIDE 0.75 MG/.5ML
0.75 INJECTION, SOLUTION SUBCUTANEOUS WEEKLY
Qty: 2 ML | Refills: 3 | Status: SHIPPED | OUTPATIENT
Start: 2024-06-04 | End: 2024-06-04

## 2024-06-04 NOTE — PROGRESS NOTES
Chief Complaint   Patient presents with    Follow - Up     DM     HPI:   Stephanie Rodriguez is a 54 year old female who presents to clinic with complaints of type 2 DM.      Prior HbA1C: 6%  Dietary compliance: fair   Exercise: good     Episodes of hypoglycemia: none   Blood Glucose: checking 2 times per day     Medications for DM: MTF and trulicity     ACE inhibitor prescribed if age ? 55 or risk factor for CAD: yes   Statin prescribed (? age 40): yes   Last dilated eye exam: utd       REVIEW OF SYSTEMS:   Negative, except per HPI.     EXAM:   /82 (BP Location: Right arm, Patient Position: Sitting, Cuff Size: adult)   Pulse 72   Wt 166 lb (75.3 kg)   LMP 05/21/2024 (Exact Date)   BMI 29.41 kg/m²   Body mass index is 29.41 kg/m².  GENERAL: well developed, well nourished, in no apparent distress  SKIN: no rashes, no suspicious lesions  HEENT: atraumatic, normocephalic  EYES: PERRLA, EOMI,conjunctiva are clear  NECK: supple, no adenopathy  LUNGS: clear to auscultation  CARDIO: RRR without murmur    ASSESSMENT AND PLAN:     1. Type 2 diabetes mellitus with hyperglycemia, without long-term current use of insulin (HCC)  - Stable condition, continue present management.    Inc trulicity dosing since A1c went up   - metFORMIN HCl 1000 MG Oral Tab; Take 1 tablet (1,000 mg total) by mouth 2 (two) times daily with meals.  Dispense: 180 tablet; Refill: 3  - POC Glycohemoglobin [18012]  - Dulaglutide (TRULICITY) 1.5 MG/0.5ML Subcutaneous Solution Pen-injector; Inject 1.5 mg into the skin once a week.  Dispense: 0.5 mL; Refill: 3    2. Essential hypertension  - Stable condition, continue present management.      3. Gastroesophageal reflux disease, unspecified whether esophagitis present  - Stable condition, continue present management.      4. Mixed hyperlipidemia  - Stable condition, continue present management.       RTC if no improvement in symptoms. Red flags discussed to go to [facility].     Fatou Sanchez,  MD  6/4/2024  9:05 AM

## 2024-07-23 NOTE — LETTER
AUTHORIZATION FOR SURGICAL OPERATION OR OTHER PROCEDURE    1. I hereby authorize Dr. Beena Gonsalez, and [facility] staff assigned to my case to perform the following operation and/or procedure at the [facility]:    _______________________________________________________________________________________________    [facility] IUD REMOVAL/ IUD INSERTION  _______________________________________________________________________________________________    2. My physician has explained the nature and purpose of the operation or other procedure, possible alternative methods of treatment, the risks involved, and the possibility of complication to me. I acknowledge that no guarantee has been made as to the result that may be obtained. 3.  I recognize that, during the course of this operation, or other procedure, unforseen conditions may necessitate additional or different procedure than those listed above. I, therefore, further authorize and request that the above named physician, his/her physician assistants or designees perform such procedures as are, in his/her professional opinion, necessary and desirable. 4.  Any tissue or organs removed in the operation or other procedure may be disposed of by and at the discretion of the [facility] and [facility] AT [facility]. 5.  I understand that in the event of a medical emergency, I will be transported by local paramedics to [facility] or other hospital emergency department. 6.  I certify that I have read and fully understand the above consent to operation and/or other procedure. 7.  I acknowledge that my physician has explained sedation/analgesia administration to me including the risks and benefits. I consent to the administration of sedation/analgesia as may be necessary or desirable in the judgement of my physician.     Witness signature: ___________________________________________________ Date:  ______/______/_____ Chief complaint:   Chief Complaint   Patient presents with    Nausea     Patient states yesterday afternoon he began have nausea issues that lead to vomiting  Patient states no fever and no diarrhea states he did have not sweats   Patient states no stomach cramps   Patient states he feels it could be due to him smoking marijuana          HISTORY OF PRESENT ILLNESS     HPI  46-year-old male accompanied by female , here with complaints of nausea and vomiting that started yesterday patient was at work.  He was able to continue working for a few hours but went home somewhat early.  At night he had night sweats and continued to have nausea.  He has not had vomiting since yesterday.  He also feels fatigued today.  He was able to hold on a few bites of food and water today however he did not go to work.  He is here requesting a work note.  He states he has been smoking quite a bit of marijuana lately, he has been under quite a bit of stress and believes this could be causing some of his symptoms as he had similar symptoms with increased use in the past.  He denies any fevers or chills, chest pain, shortness of breath, abdominal pain, diarrhea, urinary symptoms, rash.    History obtained from: patient    History limited by: none    Medical records reviewed: Patient has been seen here in urgent care on 7/15/24 for similar symptoms as well as on 6/21/2024.     Other significant problems:  Patient Active Problem List    Diagnosis Date Noted    Elevated blood pressure reading without diagnosis of hypertension 05/02/2024     Priority: Low    Bipolar 2 disorder  (CMD) 05/02/2024     Priority: Low    Schizoaffective disorder, chronic condition  (CMD) 05/02/2024     Priority: Low    Leukocytosis 05/02/2024     Priority: Low    Tobacco use 05/02/2024     Priority: Low       PAST MEDICAL, FAMILY AND SOCIAL HISTORY     Medications:  Current Outpatient Medications   Medication Sig Dispense Refill    ondansetron (ZOFRAN) 4 MG  Time:  ________ A. M.  P.M. Patient Name:  ______________________________________________________  (please print)      Patient signature:  ___________________________________________________             Relationship to Patient:           []  Parent    Responsible person                          []  Spouse  In case of minor or                    [] Other  _____________   Incompetent name:  __________________________________________________                               (please print)      _____________      Responsible person  In case of minor or  Incompetent signature:  _______________________________________________    Statement of Physician  My signature below affirms that prior to the time of the procedure, I have explained to the patient and/or his/her guardian, the risks and benefits involved in the proposed treatment and any reasonable alternative to the proposed treatment. I have also explained the risks and benefits involved in the refusal of the proposed treatment and have answered the patient's questions.                         Date:  ______/______/_______  Provider                      Signature:  __________________________________________________________       Time:  ___________ A.M    P.M. tablet Take 1-2 tablets by mouth every 8 hours as needed for Nausea. 30 tablet 0    omeprazole (PriLOSEC) 40 MG capsule Take 1 capsule by mouth daily. 30 capsule 1    paliperidone (INVEGA) 6 MG 24 hr tablet Take 9 mg by mouth every morning.      sertraline (ZOLOFT) 100 MG tablet Take 100 mg by mouth daily.      nicotine (NICODERM) 14 MG/24HR patch Place 1 patch onto the skin every 24 hours. (Patient not taking: Reported on 6/21/2024) 30 patch 3    ondansetron (ZOFRAN ODT) 4 MG disintegrating tablet Place 1 tablet onto the tongue every 6 hours as needed for Nausea. 12 tablet 0    meloxicam (Mobic) 15 MG tablet Take 1 tablet by mouth daily for 10 days. 10 tablet 0    traMADol (ULTRAM) 50 MG tablet Take 1 tablet by mouth every 6 hours as needed for Pain. 10 tablet 0    naproxen (NAPROSYN) 375 MG tablet Take 1 tablet by mouth every 12 hours as needed (pain). 15 tablet 0    hydrOXYzine (ATARAX) 10 MG tablet Take by mouth 3 times daily as needed for Itching. Indications: Feeling Anxious      propranolol (INDERAL) 10 MG tablet Take 10 mg by mouth 2 times daily. (Patient not taking: Reported on 3/11/2024)      benztropine (COGENTIN) 1 MG tablet Take 1 mg by mouth 2 times daily. (Patient not taking: Reported on 3/11/2024)      paliperidone palmitate (INVEGA SUSTENNA) 156 MG/ML extended release injection       QUEtiapine (SEROQUEL) 50 MG tablet Take 1 tablet by mouth nightly. 30 tablet 0     No current facility-administered medications for this visit.       Allergies:  ALLERGIES:   Allergen Reactions    Haloperidol Other (See Comments)       Past Medical  History/Surgeries:  Past Medical History:   Diagnosis Date    Inflammatory bowel disease     PTSD (post-traumatic stress disorder)        History reviewed. No pertinent surgical history.    Family History:  Family History   Problem Relation Age of Onset    Cancer, Throat Mother     Cancer Father     ALS Brother        Social History:  Social History     Tobacco Use     Smoking status: Every Day     Current packs/day: 1.00     Average packs/day: 1 pack/day for 31.6 years (31.6 ttl pk-yrs)     Types: Cigarettes     Start date: 1/1/1993    Smokeless tobacco: Never    Tobacco comments:     quit for 10 years between age [age]   Substance Use Topics    Alcohol use: Yes     Comment: rarely       PAST MEDICAL HISTORY, PAST SURGICAL HISTORY, SOCIAL HISTORY, MEDICATIONS, AND ALLERGIES:  Reviewed in the electronic medical chart. Pertinent history, medications, and allergies were reviewed with the patient this visit.    REVIEW OF SYSTEMS     Review of Systems   Constitutional:  Negative for chills and fever.   Respiratory:  Negative for shortness of breath.    Cardiovascular:  Negative for chest pain.   Gastrointestinal:  Positive for nausea and vomiting. Negative for abdominal pain and diarrhea.   Genitourinary: Negative.    Skin: Negative.        PHYSICAL EXAM   Vitals:  Visit Vitals  BP (!) 152/86 (BP Location: RUE - Right upper extremity, Patient Position: Sitting, Cuff Size: Large Adult)   Pulse (!) 105   Temp 99.7 °F (37.6 °C) (Temporal)   Resp 20   Ht 5' 11\" (1.803 m)   Wt 108.9 kg (240 lb)   SpO2 95%   BMI 33.47 kg/m²         Physical Exam  Vitals and nursing note reviewed.   Constitutional:       General: He is not in acute distress.     Appearance: Normal appearance. He is not ill-appearing or toxic-appearing.   HENT:      Head: Normocephalic and atraumatic.      Neck: Neck supple. No tenderness.   Cardiovascular:      Rate and Rhythm: Normal rate and regular rhythm.      Heart sounds: Normal heart sounds. No murmur heard.  Pulmonary:      Effort: Pulmonary effort is normal. No respiratory distress.      Breath sounds: Normal breath sounds. No stridor. No wheezing, rhonchi or rales.   Abdominal:      General: Abdomen is flat. Bowel sounds are normal. There is no distension.      Palpations: Abdomen is soft. There is no mass.      Tenderness: There is no abdominal tenderness. There  is no guarding or rebound.      Hernia: No hernia is present.   Lymphadenopathy:      Cervical: No cervical adenopathy.   Skin:     General: Skin is warm and dry.      Findings: No rash.   Neurological:      General: No focal deficit present.      Mental Status: He is alert and oriented to person, place, and time.   Psychiatric:         Mood and Affect: Mood normal.         Behavior: Behavior normal.             Imaging and labs:    No results found for any visits on 07/23/24 (from the past 48 hour(s)).   No results found for this visit on 07/23/24.      Procedures:        ASSESSMENT/PLAN       46-year-old male with nausea and vomiting, marijuana dependence and elevated blood pressure diagnosis of hypertension.  Advised patient clear liquids, and advance diet as tolerated.  prescription for zofran given.  Work note provided for today and tomorrow per patient request.  discussed with patient that marijuana use is associated with nausea as well as cyclic vomiting syndrome.  advised to decrease marijuana use and this may help with his symptoms.  advised to seek medical attention for worsening signs and symptoms in the er.  follow-up with his primary care doctor.  patient is understanding and in agreement with the plan.      Social determinants of health: none      Manfred was seen today for nausea.    Diagnoses and all orders for this visit:    Nausea and vomiting in adult    Marijuana dependence  (CMD)    Elevated blood pressure reading without diagnosis of hypertension    Other orders  -     ondansetron (ZOFRAN) 4 MG tablet; Take 1-2 tablets by mouth every 8 hours as needed for Nausea.         Merced Hdz PA-C

## 2024-08-29 ENCOUNTER — TELEPHONE (OUTPATIENT)
Facility: CLINIC | Age: 54
End: 2024-08-29

## 2024-08-29 NOTE — TELEPHONE ENCOUNTER
Health Maintenance Updated.    Recall entered into patient outreach in [facility].  Next colonoscopy will be due 10/5/2030.

## 2024-08-29 NOTE — TELEPHONE ENCOUNTER
----- Message from Karl Stanley sent at 8/28/2024 12:55 PM CDT -----  Please place recall colonoscopy 10/2030 due to adenomatous polyp

## 2024-09-03 ENCOUNTER — TELEPHONE (OUTPATIENT)
Dept: FAMILY MEDICINE CLINIC | Facility: CLINIC | Age: 54
End: 2024-09-03

## 2024-09-03 NOTE — TELEPHONE ENCOUNTER
Refill authorization request    Current Outpatient Medications   Medication Sig Dispense Refill                         losartan 100 MG Oral Tab Take 1 tablet (100 mg total) by mouth every morning. 90 tablet 3      Awake

## 2024-09-04 RX ORDER — HYDROCHLOROTHIAZIDE 12.5 MG/1
12.5 CAPSULE ORAL DAILY
Qty: 90 CAPSULE | Refills: 3 | Status: SHIPPED | OUTPATIENT
Start: 2024-09-04

## 2024-09-04 NOTE — TELEPHONE ENCOUNTER
Refill Per Protocol     Requested Prescriptions   Pending Prescriptions Disp Refills    hydroCHLOROthiazide 12.5 MG Oral Cap 90 capsule 3     Sig: Take 1 capsule (12.5 mg total) by mouth daily.       Hypertension Medications Protocol Passed - 9/2/2024 12:14 PM        Passed - CMP or BMP in past 12 months        Passed - Last BP reading less than 140/90     BP Readings from Last 1 Encounters:   06/04/24 132/82               Passed - In person appointment or virtual visit in the past 12 mos or appointment in next 3 mos     Recent Outpatient Visits              3 months ago [facility]    [facility] Visit    5 months ago Diabetes mellitus type 2 without retinopathy (HCC)    [facility] Aime Aguirre MD    Office Visit    5 months ago Perimenopause    [facility] - OB/GYN Armand Rod MD    Office Visit    6 months ago     [place]  [facility], PT    Office Visit    6 months ago     [place]  [facility], PT    Office Visit          Future Appointments         Provider Department Appt Notes    In 3 weeks ADO DEXA RM1; ADO CAESAR RM1 [facility] Mamm[facility]     In 6 months Armand Rod MD [facility] - OB/GYN annual                    Passed - EGFRCR or GFRNAA > 50     GFR Evaluation  EGFRCR: 107 , resulted on 12/18/2023                 Future Appointments         Provider Department Appt Notes    In 3 weeks ADO DEXA RM1; ADO CAESAR RM1 [facility]     In 6 months Armand Rod MD [facility] - OB/GYN annual          Recent Outpatient Visits              3 months ago [facility]ential hypertension    [facility]     Office Visit    5 months ago Diabetes mellitus type 2 without retinopathy (HCC)    [facility] Aime Aguirre MD    Office Visit    5 months ago Perimenopause    [facility] - OB[facility] Armand Rod MD    Office Visit    6 months ago     [place]  [facility] [place] Michael Copeland, PT    Office Visit    6 months ago     [place]  [facility] [place] Michael Copeland, PT    Office Visit

## 2024-11-26 DIAGNOSIS — I10 ESSENTIAL HYPERTENSION: ICD-10-CM

## 2024-11-29 RX ORDER — LOSARTAN POTASSIUM 100 MG/1
100 TABLET ORAL EVERY MORNING
Qty: 90 TABLET | Refills: 1 | Status: SHIPPED | OUTPATIENT
Start: 2024-11-29 | End: 2025-02-11

## 2024-11-29 NOTE — TELEPHONE ENCOUNTER
Refill passed per [facility] protocol.     Requested Prescriptions   Pending Prescriptions Disp Refills    LOSARTAN 100 MG Oral Tab [Pharmacy Med Name: Losartan Potassium 100 MG Oral Tablet] 90 tablet 0     Sig: TAKE 1 TABLET BY MOUTH ONCE DAILY IN THE MORNING       Hypertension Medications Protocol Passed - 11/29/2024 12:45 PM        Passed - CMP or BMP in past 12 months        Passed - Last BP reading less than 140/90     BP Readings from Last 1 Encounters:   06/04/24 132/82               Passed - In person appointment or virtual visit in the past 12 mos or appointment in next 3 mos     Recent Outpatient Visits              5 months ago [facility]ential [facility]    Office Visit    8 months ago Diabetes mellitus type 2 without retinopathy (HCC)    [facility] Aime Aguirre MD    Office Visit    8 months ago Perimenopause    [facility] - OB[facility] Armand Rod MD    Office Visit    9 months ago     [place]  [facility], PT    Office Visit    9 months ago     [place]  [facility], PT    Office Visit          Future Appointments         Provider Department Appt Notes    In 1 month Fatou Sanchez MD [facility] Seguimiento del control de glucosa    In 3 months Armand Rod MD [facility] - OB/GYN annual                    Passed - EGFRCR or GFRNAA > 50     GFR Evaluation  EGFRCR: 107 , resulted on 12/18/2023                Recent Outpatient Visits              5 months ago Essential hypertension    [facility]    Office Visit    8 months ago Diabetes mellitus type 2 without retinopathy (HCC)    [facility]  Aime Aguirre MD    Office Visit    8 months ago Perimenopause    [facility] - [facility] Armand Rod MD    Office Visit    9 months ago     [place]  [facility], PT    Office Visit    9 months ago     [place]  [facility]    Office Visit             Future Appointments         Provider Department Appt Notes    In 1 month Fatou Sanchez MD [facility] Seguimiento del control de glucosa    In 3 months Armand Rod MD [facility] - OB/GYN annual

## 2025-02-11 ENCOUNTER — OFFICE VISIT (OUTPATIENT)
Dept: FAMILY MEDICINE CLINIC | Facility: CLINIC | Age: 55
End: 2025-02-11

## 2025-02-11 VITALS
SYSTOLIC BLOOD PRESSURE: 134 MMHG | DIASTOLIC BLOOD PRESSURE: 83 MMHG | WEIGHT: 168 LBS | HEART RATE: 81 BPM | BODY MASS INDEX: 30 KG/M2

## 2025-02-11 DIAGNOSIS — E11.65 TYPE 2 DIABETES MELLITUS WITH HYPERGLYCEMIA, WITHOUT LONG-TERM CURRENT USE OF INSULIN (HCC): ICD-10-CM

## 2025-02-11 DIAGNOSIS — E78.00 HYPERCHOLESTEREMIA: ICD-10-CM

## 2025-02-11 DIAGNOSIS — I10 ESSENTIAL HYPERTENSION: Primary | ICD-10-CM

## 2025-02-11 PROCEDURE — 99213 OFFICE O/P EST LOW 20 MIN: CPT | Performed by: FAMILY MEDICINE

## 2025-02-11 RX ORDER — HYDROCHLOROTHIAZIDE 12.5 MG/1
12.5 CAPSULE ORAL DAILY
Qty: 90 CAPSULE | Refills: 3 | Status: SHIPPED | OUTPATIENT
Start: 2025-03-13

## 2025-02-11 RX ORDER — LOSARTAN POTASSIUM 100 MG/1
100 TABLET ORAL EVERY MORNING
Qty: 90 TABLET | Refills: 3 | Status: SHIPPED | OUTPATIENT
Start: 2025-03-13

## 2025-02-11 RX ORDER — ATORVASTATIN CALCIUM 20 MG/1
20 TABLET, FILM COATED ORAL NIGHTLY
Qty: 90 TABLET | Refills: 3 | Status: SHIPPED | OUTPATIENT
Start: 2025-03-13

## 2025-02-11 NOTE — PROGRESS NOTES
Chief Complaint   Patient presents with    Follow - Up     HPI:   Stephanie Rodriguez is a 54 year old female who presents to clinic with complaints of on DM, HTN follow up.       REVIEW OF SYSTEMS:   Negative, except per HPI.     EXAM:   /83 (BP Location: Right arm, Patient Position: Sitting, Cuff Size: adult)   Pulse 81   Wt 168 lb (76.2 kg)   LMP 05/21/2024 (Exact Date)   BMI 29.76 kg/m²   Body mass index is 29.76 kg/m².  GENERAL: well developed, well nourished, in no apparent distress  SKIN: no rashes, no suspicious lesions  HEENT: atraumatic, normocephalic  EYES: PERRLA, EOMI,conjunctiva are clear  NECK: supple, no adenopathy  LUNGS: clear to auscultation  CARDIO: RRR without murmur    ASSESSMENT AND PLAN:     1. Essential hypertension  Bp elevated in office today.  Patient to continue to monitor  at home.  If needed we can increase her hydrochlorothiazide dosing.    Hydrochlorothiazide 12.5 MG Oral Cap; Take 1 capsule (12.5 mg total) by mouth daily.  Dispense: 90 capsule; Refill: 3  - losartan 100 MG Oral Tab; Take 1 tablet (100 mg total) by mouth every morning.  Dispense: 90 tablet; Refill: 3    2. Type 2 diabetes mellitus with hyperglycemia, without long-term current use of insulin (HCC)  - Stable condition, continue present management.    - [facility]   - Comp Metabolic Panel (14); Future  - Hemoglobin A1C; Future  - Lipid Panel; Future  - Microalb/Creat Ratio, Random Urine; Future  - Comp Metabolic Panel (14)  - Hemoglobin A1C  - Lipid Panel  - Microalb/Creat Ratio, Random Urine  - metFORMIN HCl 1000 MG Oral Tab; Take 1 tablet (1,000 mg total) by mouth 2 (two) times daily with meals.  Dispense: 180 tablet; Refill: 3    3. Hypercholesteremia  - atorvastatin 20 MG Oral Tab; Take 1 tablet (20 mg total) by mouth nightly.  Dispense: 90 tablet; Refill: 3     RTC if no improvement in symptoms. Red flags discussed to go to ER.     Fatou Sanchez MD  2/11/2025  11:03 AM

## 2025-06-03 DIAGNOSIS — I10 ESSENTIAL HYPERTENSION: ICD-10-CM

## 2025-06-03 NOTE — TELEPHONE ENCOUNTER
Please review.  Protocol failed / Has no protocol.    Prescriptions are marked as Print Script from 3/13/25. Did patient ?  Pharmacy is requesting escribe.    [employer] message sent to patient to complete labs outstanding from last office visit 2/11/25     Requested Prescriptions   Pending Prescriptions Disp Refills    LOSARTAN 100 MG Oral Tab [Pharmacy Med Name: Losartan Potassium 100 MG Oral Tablet] 90 tablet 3     Sig: TAKE 1 TABLET BY MOUTH IN THE MORNING       Hypertension Medications Protocol Failed - 6/3/2025  6:30 PM        Failed - CMP or BMP in past 12 months        Failed - EGFRCR or GFRNAA > 50        Passed - Last BP reading less than 140/90        Passed - In person appointment or virtual visit in the past 12 mos or appointment in next 3 mos        Passed - Medication is active on med list

## 2025-06-05 RX ORDER — LOSARTAN POTASSIUM 100 MG/1
100 TABLET ORAL EVERY MORNING
Qty: 90 TABLET | Refills: 0 | Status: SHIPPED | OUTPATIENT
Start: 2025-06-05

## 2025-07-13 DIAGNOSIS — E11.65 TYPE 2 DIABETES MELLITUS WITH HYPERGLYCEMIA, WITHOUT LONG-TERM CURRENT USE OF INSULIN (HCC): ICD-10-CM

## 2025-07-17 NOTE — TELEPHONE ENCOUNTER
For replies, please route to pool: [facility] CENTRAL REFILLS    Please review: medication fails/has no protocol attached.  No future appointments with primary care medicine     MyChart message sent, reminding patient of lab orders placed 2/11/25 and needing to be completed.

## (undated) DEVICE — KIT CLEAN ENDOKIT 1.1OZ GOWNX2

## (undated) DEVICE — MEDI-VAC NON-CONDUCTIVE SUCTION TUBING 6MM X 1.8M (6FT.) L: Brand: CARDINAL HEALTH

## (undated) DEVICE — SNARE ENDOSCOPIC 10MM ROUND

## (undated) DEVICE — KIT ENDO ORCAPOD 160/180/190

## (undated) DEVICE — SNARE OPTMZ PLPCTM TRP

## (undated) DEVICE — Device: Brand: DUAL NARE NASAL CANNULAE FEMALE LUER CON 7FT O2 TUBE

## (undated) DEVICE — STERILE LATEX POWDER-FREE SURGICAL GLOVESWITH NITRILE COATING: Brand: PROTEXIS

## (undated) DEVICE — 60 ML SYRINGE REGULAR TIP: Brand: MONOJECT

## (undated) NOTE — LETTER
2024      No Recipients     Patient: Stephanie Rodriguez   YOB: 1970   Date of Visit: 3/19/2024       Dear Dr. Ruano Recipients:    Thank you for referring Stephanie Rodriguez to me for evaluation. Here is my assessment and plan of care:    Stephanie Rodriguez is a 54 year old female.    HPI:     HPI    Pt is here for diabetic eye exam.  Pt denies vision changes.    Pt has been a diabetic for 7 years       Pt's diabetes is currently controlled by pills and injectable  Pt checks BS once a day   Pt's last blood sugar was  100 this morning  Last HA1C was 6.0 on 23  Endocrinologist: none        Last edited by Argentina Hong, O.T. on 3/19/2024 10:19 AM.        Patient History:  Past Medical History:   Diagnosis Date    Diabetes (HCC)     Essential hypertension     High blood pressure     High cholesterol     Hyperlipidemia     PONV (postoperative nausea and vomiting)     Visual impairment        Surgical History: Stephanie Rodriguez has a past surgical history that includes  and colonoscopy (N/A, 10/5/2023) (Procedure: COLONOSCOPY with polypectomy;  Surgeon: Karl Stanley MD;  Location: [facility]).    Family History   Problem Relation Age of Onset    Diabetes Mother     Hypertension Mother     Breast Cancer Maternal Aunt     Glaucoma Neg     Macular degeneration Neg        Social History:   Social History     Socioeconomic History    Marital status:    Tobacco Use    Smoking status: Never     Passive exposure: Never    Smokeless tobacco: Never   Vaping Use    Vaping Use: Never used   Substance and Sexual Activity    Alcohol use: Never    Drug use: Never   Other Topics Concern    Grew up on a farm No    History of tanning No    Outdoor occupation No    Breast feeding No    Reaction to local anesthetic No    Pt has a pacemaker No    Pt has a defibrillator No       Medications:  Current Outpatient Medications   Medication Sig Dispense Refill    Dulaglutide (TRULICITY) 0.75  MG/0.5ML Subcutaneous Solution Pen-injector Inject 0.75 mg into the skin once a week. 2 mL 3    losartan 100 MG Oral Tab Take 1 tablet (100 mg total) by mouth every morning. 90 tablet 3    atorvastatin 20 MG Oral Tab Take 1 tablet (20 mg total) by mouth nightly. 90 tablet 3    hydroCHLOROthiazide 12.5 MG Oral Cap Take 1 capsule (12.5 mg total) by mouth daily. 90 capsule 3    metFORMIN HCl 1000 MG Oral Tab Take 1 tablet (1,000 mg total) by mouth 2 (two) times daily with meals. 180 tablet 3    calcium carbonate-vitamin D 250-125 MG-UNIT Oral Tab Take 1 tablet by mouth 2 (two) times daily with meals.      pantoprazole 40 MG Oral Tab EC Take 1 tablet (40 mg total) by mouth every morning before breakfast. PRN      Calcium 250 MG Oral Cap Take 1 capsule by mouth daily. 90 capsule 3    pneumococcal 20-Nubia conj vacc (PREVNAR 20) 0.5 ML Intramuscular Suspension Prefilled Syringe Inject 0.5 mL into the muscle Prior to discharge. (Patient not taking: Reported on 3/18/2024) 0.5 mL 0       Allergies:  No Known Allergies    ROS:     ROS    Positive for: Endocrine  Negative for: Constitutional, Gastrointestinal, Neurological, Skin, Genitourinary, Musculoskeletal, HENT, Cardiovascular, Eyes, Respiratory, Psychiatric, Allergic/Imm, Heme/Lymph  Last edited by Argentina Hong, O.T. on 3/19/2024 10:19 AM.          PHYSICAL EXAM:     Base Eye Exam       Visual Acuity (Snellen - Linear)         Right Left    Dist cc 20/30 -2 20/30 -1    Dist ph cc 20/20 -2 20/25 -2    Near cc 20/30 20/30      Correction: Glasses              Tonometry ([facility], 10:31 AM)         Right Left    Pressure 17 15              Neuro/Psych       Oriented x3: Yes              Dilation       Both eyes: 1.0% Mydriacyl and 2.5% Tobias Synephrine @ 10:31 AM                  Slit Lamp and Fundus Exam       Slit Lamp Exam         Right Left    Lids/Lashes Dermatochalasis, Meibomian gland dysfunction Dermatochalasis, Meibomian gland dysfunction    Conjunctiva/Sclera  Nasal/temp pinguecula Nasal/temp pinguecula    Cornea Clear Clear    Anterior Chamber Deep and quiet Deep and quiet    Iris Normal Normal    Lens Trace Nuclear sclerosis Trace Nuclear sclerosis    Vitreous Vitreous floaters Vitreous floaters              Fundus Exam         Right Left    Disc Good rim, Temporal crescent Good rim, Temporal crescent    C/D Ratio 0.35 0.4    Macula Normal, no BDR Normal, no BDR    Vessels Normal Normal    Periphery Normal Normal                  Refraction       Wearing Rx         Sphere Cylinder Axis Add    Right -0.25 +0.50 115 +1.25    Left -0.25 +1.00 080 +1.25      Age: 6yrs    Type: Progressive bifocal              Manifest Refraction         Sphere Cylinder [place] Dist VA Add Near VA    Right +0.25 +0.50 115 20/20- +2.25 20/20    Left [place] +0.75 080 20/25- +2.25 20/20              Final Rx         Sphere Cylinder [place] Dist VA Add Near VA    Right +0.25 +0.50 115 20/20- +2.25 20/20    Left [place] +0.75 080 20/25- +2.25 20/20      Type: Progressive bifocal                     ASSESSMENT/PLAN:     Diagnoses and Plan:     Diabetes mellitus type 2 without retinopathy (HCC)  Diabetes type II: no background of retinopathy, no signs of neovascularization noted.  Discussed ocular and systemic benefits of blood sugar control.  Diagnosis and treatment discussed in detail with patient.    Will see patient in 1 year for a diabetic exam    Age-related nuclear cataract of both eyes  Discussed early cataracts with patient. Told patient that cataracts are age appropriate and they are not surgical at this time.  No treatment recommended at this time.     New glasses Rx given today, update as needed    Floaters in visual field, bilateral   There is no evidence of retinal pathology.  All signs and symptoms of retinal detachment/tears explained in detail.    Patient instructed to call the office if they experience increase in floaters, increase in flashes of light, loss of vision or curtain or veil  effect.       No orders of the defined types were placed in this encounter.      Meds This Visit:  Requested Prescriptions      No prescriptions requested or ordered in this encounter        Follow up instructions:  Return in about 1 year (around 3/19/2025) for Diabetic eye exam.    3/19/2024  Scribed by: Aime Aguirre MD        If you have questions, please do not hesitate to call me. I look forward to following Stephanie along with you.    Sincerely,        Aime Aguirre MD        CC:   No Recipients    Document electronically generated by: Aime Aguirre MD

## (undated) NOTE — LETTER
AUTHORIZATION FOR SURGICAL OPERATION OR OTHER PROCEDURE    1. I hereby authorize Dr. Tone Livingston, and [place] [employer] [facility] staff assigned to my case to perform the following operation and/or procedure at the [facility]:    _______________________________________________________________________________________________    Netta Pippa IUD REMOVAL/RE-INSERTION  _______________________________________________________________________________________________    2. My physician has explained the nature and purpose of the operation or other procedure, possible alternative methods of treatment, the risks involved, and the possibility of complication to me. I acknowledge that no guarantee has been made as to the result that may be obtained. 3.  I recognize that, during the course of this operation, or other procedure, unforseen conditions may necessitate additional or different procedure than those listed above. I, therefore, further authorize and request that the above named physician, his/her physician assistants or designees perform such procedures as are, in his/her professional opinion, necessary and desirable. 4.  Any tissue or organs removed in the operation or other procedure may be disposed of by and at the discretion of the [facility] and [facility]. 5.  I understand that in the event of a medical emergency, I will be transported by local paramedics to [facility] or other hospital emergency department. 6.  I certify that I have read and fully understand the above consent to operation and/or other procedure. 7.  I acknowledge that my physician has explained sedation/analgesia administration to me including the risks and benefits. I consent to the administration of sedation/analgesia as may be necessary or desirable in the judgement of my physician.     Witness signature: ___________________________________________________ Date:  ______/______/_____ Time:  ________ A. M.  P.M. Patient Name:  ______________________________________________________  (please print)      Patient signature:  ___________________________________________________             Relationship to Patient:           []  Parent    Responsible person                          []  Spouse  In case of minor or                    [] Other  _____________   Incompetent name:  __________________________________________________                               (please print)      _____________      Responsible person  In case of minor or  Incompetent signature:  _______________________________________________    Statement of Physician  My signature below affirms that prior to the time of the procedure, I have explained to the patient and/or his/her guardian, the risks and benefits involved in the proposed treatment and any reasonable alternative to the proposed treatment. I have also explained the risks and benefits involved in the refusal of the proposed treatment and have answered the patient's questions.                         Date:  ______/______/_______  Provider                      Signature:  __________________________________________________________       Time:  ___________ A.M    P.M.

## (undated) NOTE — LETTER
[address] [place] [address], [place]  Authorization for Surgical Operation and Procedure                                                                                           I hereby authorize Pérez Aquino MD, my physician and his/her assistants (if applicable), which may include medical students, residents, and/or fellows, to perform the following surgical operation/ procedure and administer such anesthesia as may be determined necessary by my physician: Operation/Procedure name (s) COLONOSCOPY on [employer]   2. I recognize that during the surgical operation/procedure, unforeseen conditions may necessitate additional or different procedures than those listed above. I, therefore, further authorize and request that the above-named surgeon, assistants, or designees perform such procedures as are, in their judgment, necessary and desirable. 3.   My surgeon/physician has discussed prior to my surgery the potential benefits, risks and side effects of this procedure; the likelihood of achieving goals; and potential problems that might occur during recuperation. They also discussed reasonable alternatives to the procedure, including risks, benefits, and side effects related to the alternatives and risks related to not receiving this procedure. I have had all my questions answered and I acknowledge that no guarantee has been made as to the result that may be obtained. 4.   Should the need arise during my operation/procedure, which includes change of level of care prior to discharge, I also consent to the administration of blood and/or blood products. Further, I understand that despite careful testing and screening of blood or blood products by collecting agencies, I may still be subject to ill effects as a result of receiving a blood transfusion and/or blood products.   The following are some, but not all, of the potential risks that can occur: fever and allergic reactions, hemolytic reactions, transmission of diseases such as Hepatitis, AIDS and Cytomegalovirus (CMV) and fluid overload. In the event that I wish to have an autologous transfusion of my own blood, or a directed donor transfusion, I will discuss this with my physician. Check only if Refusing Blood or Blood Products  I understand refusal of blood or blood products as deemed necessary by my physician may have serious consequences to my condition to include possible death. I hereby assume responsibility for my refusal and release the hospital, its personnel, and my physicians from any responsibility for the consequences of my refusal.    o  Refuse   5. I authorize the use of any specimen, organs, tissues, body parts or foreign objects that may be removed from my body during the operation/procedure for diagnosis, research or teaching purposes and their subsequent disposal by hospital authorities. I also authorize the release of specimen test results and/or written reports to my treating physician on the hospital medical staff or other referring or consulting physicians involved in my care, at the discretion of the Pathologist or my treating physician. 6.   I consent to the photographing or videotaping of the operations or procedures to be performed, including appropriate portions of my body for medical, scientific, or educational purposes, provided my identity is not revealed by the pictures or by descriptive texts accompanying them. If the procedure has been photographed/videotaped, the surgeon will obtain the original picture, image, videotape or CD. The hospital will not be responsible for storage, release or maintenance of the picture, image, tape or CD.    7.   I consent to the presence of a  or observers in the operating room as deemed necessary by my physician or their designees.     8.   I recognize that in the event my procedure results in extended X-Ray/fluoroscopy time, I may develop a skin reaction. 9. If I have a Do Not Attempt Resuscitation (DNAR) order in place, that status will be suspended while in the operating room, procedural suite, and during the recovery period unless otherwise explicitly stated by me (or a person authorized to consent on my behalf). The surgeon or my attending physician will determine when the applicable recovery period ends for purposes of reinstating the DNAR order. 10. Patients having a sterilization procedure: I understand that if the procedure is successful the results will be permanent and it will therefore be impossible for me to inseminate, conceive, or bear children. I also understand that the procedure is intended to result in sterility, although the result has not been guaranteed. 11. I acknowledge that my physician has explained sedation/analgesia administration to me including the risk and benefits I consent to the administration of sedation/analgesia as may be necessary or desirable in the judgment of my physician. I CERTIFY THAT I HAVE READ AND FULLY UNDERSTAND THE ABOVE CONSENT TO OPERATION and/or OTHER PROCEDURE.     _________________________________________ _________________________________     ___________________________________  Signature of Patient     Signature of Responsible Person                   Printed Name of Responsible Person                              _________________________________________ ______________________________        ___________________________________  Signature of Witness         Date  Time         Relationship to Patient    STATEMENT OF PHYSICIAN My signature below affirms that prior to the time of the procedure; I have explained to the patient and/or his/her legal representative, the risks and benefits involved in the proposed treatment and any reasonable alternative to the proposed treatment.  I have also explained the risks and benefits involved in refusal of the proposed treatment and alternatives to the proposed treatment and have answered the patient's questions.  If I have a significant financial interest in a co-management agreement or a significant financial interest in any product or implant, or other significant relationship used in this procedure/surgery, I have disclosed this and had a discussion with my patient.     _______________________________________________________________ _____________________________  (Signature of Physician)                                                                                         (Date)                                   (Time)  Patient Name: Kurt Ribera    : 3/5/1970   Printed: 10/3/2023      Medical Record #: [id number]                                              Page 1 of